# Patient Record
Sex: FEMALE | Race: WHITE | NOT HISPANIC OR LATINO | Employment: UNEMPLOYED | URBAN - NONMETROPOLITAN AREA
[De-identification: names, ages, dates, MRNs, and addresses within clinical notes are randomized per-mention and may not be internally consistent; named-entity substitution may affect disease eponyms.]

---

## 2017-08-03 ENCOUNTER — INITIAL PRENATAL (OUTPATIENT)
Dept: OBSTETRICS AND GYNECOLOGY | Facility: CLINIC | Age: 27
End: 2017-08-03

## 2017-08-03 VITALS
WEIGHT: 117 LBS | SYSTOLIC BLOOD PRESSURE: 120 MMHG | HEIGHT: 59 IN | DIASTOLIC BLOOD PRESSURE: 58 MMHG | BODY MASS INDEX: 23.59 KG/M2

## 2017-08-03 DIAGNOSIS — O99.320 SUBSTANCE ABUSE AFFECTING PREGNANCY, ANTEPARTUM: ICD-10-CM

## 2017-08-03 DIAGNOSIS — Z34.82 ENCOUNTER FOR SUPERVISION OF OTHER NORMAL PREGNANCY IN SECOND TRIMESTER: Primary | ICD-10-CM

## 2017-08-03 PROBLEM — F19.10 SUBSTANCE ABUSE AFFECTING PREGNANCY, ANTEPARTUM: Status: ACTIVE | Noted: 2017-08-03

## 2017-08-03 PROBLEM — Z34.90 SUPERVISION OF NORMAL PREGNANCY: Status: ACTIVE | Noted: 2017-08-03

## 2017-08-03 PROCEDURE — 99213 OFFICE O/P EST LOW 20 MIN: CPT | Performed by: MIDWIFE

## 2017-08-03 RX ORDER — BUPRENORPHINE HYDROCHLORIDE 8 MG/1
TABLET SUBLINGUAL
Refills: 0 | Status: ON HOLD | COMMUNITY
Start: 2017-07-03 | End: 2023-01-05

## 2017-08-03 RX ORDER — PRENATAL VIT NO.126/IRON/FOLIC 28MG-0.8MG
TABLET ORAL DAILY
Status: ON HOLD | COMMUNITY
End: 2023-01-05

## 2017-08-03 NOTE — PROGRESS NOTES
Subjective     Chief Complaint   Patient presents with   • Initial Prenatal Visit     Transfer from Dr Strickland in [facility], has had anatomy scan but gender not determined,       Anna Calix is a 26 y.o. year old .  Patient's last menstrual period was 2017..  She presents to be seen to initiate prenatal care with our practice. She has received care with Dr Strickland in [place]. Her prenatal course has been complicated by Subutex use. She receives this by Rx from Dr Ortiz in [place]. She is taking 8mg daily and trying to cut back. Wants to be off of it by the time baby is born. 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      The following portions of the patient's history were reviewed and updated as appropriate:vital signs, allergies, current medications, past medical history, past social history, past surgical history and problem list.    Review of Systems -   General: NAD  Gastrointestinal: denies nausea, vomiting, diarrhea or constipation   Genitourinary: denies dysuria    Objective     Physical Exam  Constitutional   The patient is awake, alert, well developed, well nourished and well groomed. Very talkative.  Cardiovascular  Heart regular rate and rhythm  No lower extremity edema  Respiratory  The patient is relaxed and breathes without effort. Lungs CTAB  Gastrointestinal   The abdomen is soft and non tender. Gravid. Size approximate to dates  +FHT's  Psychiatric :  Oriented to person, place, and time. Speech is fluent and words are clear. Thought processes are coherent, insight is good.     Imaging   No data reviewed      ASSESSMENT  1. IUP @ 26w1d  2. Transfer of care    PLAN  1. Tests ordered today:  Orders Placed This Encounter   Procedures   • US Ob Follow Up Transabdominal Approach     Standing Status:   Future     Standing Expiration Date:   8/3/2018     Order Specific Question:   Reason for Exam:     Answer:   growth scan     2. Medications prescribed today:  New Medications Ordered This Visit    Medications   • buprenorphine (SUBUTEX) 8 MG sublingual tablet SL tablet     Refill:  0   • Prenatal Vit-Fe Fumarate-FA (PRENATAL, CLASSIC, VITAMIN) 28-0.8 MG tablet tablet     Sig: Take  by mouth Daily.     3. Information reviewed:/Encouraged Questions & answered   4. Request records from previous OB-GYN practice   5. Call prn      Follow up: 4 week(s)         This note was electronically signed.    Sue Sultana CNM  August 3, 2017

## 2017-09-11 ENCOUNTER — ROUTINE PRENATAL (OUTPATIENT)
Dept: OBSTETRICS AND GYNECOLOGY | Facility: CLINIC | Age: 27
End: 2017-09-11

## 2017-09-11 VITALS — WEIGHT: 119 LBS | BODY MASS INDEX: 24.04 KG/M2 | DIASTOLIC BLOOD PRESSURE: 60 MMHG | SYSTOLIC BLOOD PRESSURE: 114 MMHG

## 2017-09-11 DIAGNOSIS — Z34.83 ENCOUNTER FOR SUPERVISION OF OTHER NORMAL PREGNANCY IN THIRD TRIMESTER: ICD-10-CM

## 2017-09-11 DIAGNOSIS — O99.320 SUBSTANCE ABUSE AFFECTING PREGNANCY, ANTEPARTUM: ICD-10-CM

## 2017-09-11 DIAGNOSIS — J40 BRONCHITIS: Primary | ICD-10-CM

## 2017-09-11 PROCEDURE — 99213 OFFICE O/P EST LOW 20 MIN: CPT | Performed by: OBSTETRICS & GYNECOLOGY

## 2017-09-11 RX ORDER — AZITHROMYCIN 250 MG/1
TABLET, FILM COATED ORAL
Qty: 6 TABLET | Refills: 0 | Status: SHIPPED | OUTPATIENT
Start: 2017-09-11 | End: 2017-10-30

## 2017-09-11 NOTE — PROGRESS NOTES
Chief Complaint   Patient presents with   • Routine Prenatal Visit     patient complains of cough and congestion.        HPI: Anna is a  currently at 31w5d who today reports the following:  Contractions - No; Leaking - No; Vaginal bleeding -  No; Heartburn - No.  Patient with complaints of cough and congestion that is yellow in nature.  Pt also reports low grade fever.  Pt also reports starting to smoke recently as patient has been decreasing subutex.  Patient reports having all her labs done at Dr. Strickland's office including glucola.  ROS:   GI:   Nausea - No; Constipation - No; Diarrhea - No.   Neuro:  Headache - No; Visual disturbances - No.    EXAM:   Vitals:  See prenatal flowsheet as noted and reviewed   Lungs:  CTA; no wheezes noted; few ronchi that clears with coughing   CV:  RRR   Abdomen:   See prenatal flowsheet as noted and reviewed   Pelvic:  See prenatal flowsheet as noted and reviewed   Urine:  See prenatal flowsheet as noted and reviewed     No results found for: ABORH, ABO, RH, LABANTI, ABSCRN    MDM:  Impression: Supervision of pregnancy  +Smoker   +History of drug abuse  +Bronchitis   Tests done today: Rx Zithromax given  Instructions and precautions given  Previous scan reviewed with patient   Topics discussed: kick counts and fetal movement   labor signs and symptoms  tobacco use   Need copy of previous labs from Dr. Strickland   Tests next visit: Scan next visit to assess growth given history and findings     This note was electronically signed.  Barbie Hyman M.D.

## 2017-10-02 ENCOUNTER — ROUTINE PRENATAL (OUTPATIENT)
Dept: OBSTETRICS AND GYNECOLOGY | Facility: CLINIC | Age: 27
End: 2017-10-02

## 2017-10-02 VITALS — DIASTOLIC BLOOD PRESSURE: 62 MMHG | SYSTOLIC BLOOD PRESSURE: 108 MMHG | BODY MASS INDEX: 24.64 KG/M2 | WEIGHT: 122 LBS

## 2017-10-02 DIAGNOSIS — O99.320 SUBSTANCE ABUSE AFFECTING PREGNANCY, ANTEPARTUM: ICD-10-CM

## 2017-10-02 DIAGNOSIS — Z34.83 ENCOUNTER FOR SUPERVISION OF OTHER NORMAL PREGNANCY IN THIRD TRIMESTER: ICD-10-CM

## 2017-10-02 PROCEDURE — 99212 OFFICE O/P EST SF 10 MIN: CPT | Performed by: OBSTETRICS & GYNECOLOGY

## 2017-10-02 NOTE — PATIENT INSTRUCTIONS
Prenatal Care  WHAT IS PRENATAL CARE?   Prenatal care is the process of caring for a pregnant woman before she gives birth. Prenatal care makes sure that she and her baby remain as healthy as possible throughout pregnancy. Prenatal care may be provided by a midwife, family practice health care provider, or a childbirth and pregnancy specialist (obstetrician). Prenatal care may include physical examinations, testing, treatments, and education on nutrition, lifestyle, and social support services.  WHY IS PRENATAL CARE SO IMPORTANT?   Early and consistent prenatal care increases the chance that you and your baby will remain healthy throughout your pregnancy. This type of care also decreases a baby's risk of being born too early (prematurely), or being born smaller than expected (small for gestational age). Any underlying medical conditions you may have that could pose a risk during your pregnancy are discussed during prenatal care visits. You will also be monitored regularly for any new conditions that may arise during your pregnancy so they can be treated quickly and effectively.  WHAT HAPPENS DURING PRENATAL CARE VISITS?  Prenatal care visits may include the following:  Discussion  Tell your health care provider about any new signs or symptoms you have experienced since your last visit. These might include:  · Nausea or vomiting.  · Increased or decreased level of energy.  · Difficulty sleeping.  · Back or leg pain.  · Weight changes.  · Frequent urination.  · Shortness of breath with physical activity.  · Changes in your skin, such as the development of a rash or itchiness.  · Vaginal discharge or bleeding.  · Feelings of excitement or nervousness.  · Changes in your baby's movements.  You may want to write down any questions or topics you want to discuss with your health care provider and bring them with you to your appointment.  Examination  During your first prenatal care visit, you will likely have a complete  physical exam. Your health care provider will often examine your vagina, cervix, and the position of your uterus, as well as check your heart, lungs, and other body systems. As your pregnancy progresses, your health care provider will measure the size of your uterus and your baby's position inside your uterus. He or she may also examine you for early signs of labor. Your prenatal visits may also include checking your blood pressure and, after about 10-12 weeks of pregnancy, listening to your baby's heartbeat.  Testing  Regular testing often includes:  · Urinalysis. This checks your urine for glucose, protein, or signs of infection.  · Blood count. This checks the levels of white and red blood cells in your body.  · Tests for sexually transmitted infections (STIs). Testing for STIs at the beginning of pregnancy is routinely done and is required in many states.  · Antibody testing. You will be checked to see if you are immune to certain illnesses, such as rubella, that can affect a developing fetus.  · Glucose screen. Around 24-28 weeks of pregnancy, your blood glucose level will be checked for signs of gestational diabetes. Follow-up tests may be recommended.  · Group B strep. This is a bacteria that is commonly found inside a woman's vagina. This test will inform your health care provider if you need an antibiotic to reduce the amount of this bacteria in your body prior to labor and childbirth.  · Ultrasound. Many pregnant women undergo an ultrasound screening around 18-20 weeks of pregnancy to evaluate the health of the fetus and check for any developmental abnormalities.  · HIV (human immunodeficiency virus) testing. Early in your pregnancy, you will be screened for HIV. If you are at high risk for HIV, this test may be repeated during your third trimester of pregnancy.  You may be offered other testing based on your age, personal or family medical history, or other factors.   HOW OFTEN SHOULD I PLAN TO SEE MY  HEALTH CARE PROVIDER FOR PRENATAL CARE?  Your prenatal care check-up schedule depends on any medical conditions you have before, or develop during, your pregnancy. If you do not have any underlying medical conditions, you will likely be seen for checkups:  · Monthly, during the first 6 months of pregnancy.  · Twice a month during months 7 and 8 of pregnancy.  · Weekly starting in the 9th month of pregnancy and until delivery.  If you develop signs of early labor or other concerning signs or symptoms, you may need to see your health care provider more often. Ask your health care provider what prenatal care schedule is best for you.  WHAT CAN I DO TO KEEP MYSELF AND MY BABY AS HEALTHY AS POSSIBLE DURING MY PREGNANCY?  · Take a prenatal vitamin containing 400 micrograms (0.4 mg) of folic acid every day. Your health care provider may also ask you to take additional vitamins such as iodine, vitamin D, iron, copper, and zinc.  · Take 2329-2611 mg of calcium daily starting at your 20th week of pregnancy until you deliver your baby.  · Make sure you are up to date on your vaccinations. Unless directed otherwise by your health care provider:    You should receive a tetanus, diphtheria, and pertussis (Tdap) vaccination between the 27th and 36th week of your pregnancy, regardless of when your last Tdap immunization occurred. This helps protect your baby from whooping cough (pertussis) after he or she is born.    You should receive an annual inactivated influenza vaccine (IIV) to help protect you and your baby from influenza. This can be done at any point during your pregnancy.  · Eat a well-rounded diet that includes:    Fresh fruits and vegetables.    Lean proteins.    Calcium-rich foods such as milk, yogurt, hard cheeses, and dark, leafy greens.    Whole grain breads.  · Do not eat seafood high in mercury, including:    Swordfish.    Tilefish.    Shark.    Lenny mackerel.    More than 6 oz tuna per week.  · Do not eat:    Raw  or undercooked meats or eggs.    Unpasteurized foods, such as soft cheeses (brie, blue, or feta), juices, and milks.    Lunch meats.    Hot dogs that have not been heated until they are steaming.  · Drink enough water to keep your urine clear or pale yellow. For many women, this may be 10 or more 8 oz glasses of water each day. Keeping yourself hydrated helps deliver nutrients to your baby and may prevent the start of pre-term uterine contractions.  · Do not use any tobacco products including cigarettes, chewing tobacco, or electronic cigarettes. If you need help quitting, ask your health care provider.  · Do not drink beverages containing alcohol. No safe level of alcohol consumption during pregnancy has been determined.  · Do not use any illegal drugs. These can harm your developing baby or cause a miscarriage.  · Ask your health care provider or pharmacist before taking any prescription or over-the-counter medicines, herbs, or supplements.  · Limit your caffeine intake to no more than 200 mg per day.  · Exercise. Unless told otherwise by your health care provider, try to get 30 minutes of moderate exercise most days of the week. Do not  do high-impact activities, contact sports, or activities with a high risk of falling, such as horseback riding or downhill skiing.  · Get plenty of rest.  · Avoid anything that raises your body temperature, such as hot tubs and saunas.  · If you own a cat, do not empty its litter box. Bacteria contained in cat feces can cause an infection called toxoplasmosis. This can result in serious harm to the fetus.  · Stay away from chemicals such as insecticides, lead, mercury, and cleaning or paint products that contain solvents.  · Do not have any X-rays taken unless medically necessary.  · Take a childbirth and breastfeeding preparation class. Ask your health care provider if you need a referral or recommendation.     This information is not intended to replace advice given to you by  your health care provider. Make sure you discuss any questions you have with your health care provider.     Document Released: 12/20/2004 Document Revised: 04/10/2017 Document Reviewed: 03/04/2015  Elsevier Interactive Patient Education ©2017 [employer]

## 2017-10-02 NOTE — PROGRESS NOTES
Chief Complaint   Patient presents with   • Routine Prenatal Visit     No complaints        HPI:   , 34w5d gestation reports doing well, U/S 79%, LISSA 16. Vertex, Prior  x 2, Proven to 7#8    ROS:  See Prenatal Episode/Flowsheet  /62  Wt 122 lb (55.3 kg)  LMP 2017  BMI 24.64 kg/m2     EXAM:  EXTREMITIES:  No swelling-See Prenatal Episode/Flowsheet    ABDOMEN:  FHTs/Movement noted-See Prenatal Episode/Flowsheet    URINE GLUCOSE/PROTEIN:  See Prenatal Episode/Flowsheet    PELVIC EXAM:  See Prenatal Episode/Flowsheet    MDM:    No results found for: HGB, RUBELLAIGGIN, RUBELLASCRN, HEPBSAG, LABRPR, ABORH, ABO, RH, ABSCRN, LABANTI, ABID, [id number], [id number], HEPCVIRUSABY, [id number], GBSANTIGEN, URINECX    Routine care. AB+/RI/RS/HCV+  Dicussed subutex in pregnancy    Glucola tmorrow as we have not gotten results from prior MD

## 2017-10-03 ENCOUNTER — RESULTS ENCOUNTER (OUTPATIENT)
Dept: OBSTETRICS AND GYNECOLOGY | Facility: CLINIC | Age: 27
End: 2017-10-03

## 2017-10-03 DIAGNOSIS — Z34.83 ENCOUNTER FOR SUPERVISION OF OTHER NORMAL PREGNANCY IN THIRD TRIMESTER: ICD-10-CM

## 2017-10-03 LAB
BASOPHILS # BLD AUTO: 0.04 10*3/MM3 (ref 0–0.2)
BASOPHILS NFR BLD AUTO: 0.5 % (ref 0–2.5)
EOSINOPHIL # BLD AUTO: 0.07 10*3/MM3 (ref 0–0.7)
EOSINOPHIL NFR BLD AUTO: 0.9 % (ref 0–7)
ERYTHROCYTE [DISTWIDTH] IN BLOOD BY AUTOMATED COUNT: 13.8 % (ref 11.5–14.5)
GLUCOSE 1H P 50 G GLC PO SERPL-MCNC: 126 MG/DL
HCT VFR BLD AUTO: 38.6 % (ref 37–47)
HGB BLD-MCNC: 12.8 G/DL (ref 12–16)
IMM GRANULOCYTES # BLD: 0.09 10*3/MM3 (ref 0–0.06)
IMM GRANULOCYTES NFR BLD: 1.2 % (ref 0–0.6)
LYMPHOCYTES # BLD AUTO: 1.71 10*3/MM3 (ref 0.6–3.4)
LYMPHOCYTES NFR BLD AUTO: 22.9 % (ref 10–50)
MCH RBC QN AUTO: 31.1 PG (ref 27–31)
MCHC RBC AUTO-ENTMCNC: 33.2 G/DL (ref 30–37)
MCV RBC AUTO: 93.7 FL (ref 81–99)
MONOCYTES # BLD AUTO: 0.36 10*3/MM3 (ref 0–0.9)
MONOCYTES NFR BLD AUTO: 4.8 % (ref 0–12)
NEUTROPHILS # BLD AUTO: 5.2 10*3/MM3 (ref 2–6.9)
NEUTROPHILS NFR BLD AUTO: 69.7 % (ref 37–80)
NRBC BLD AUTO-RTO: 0 /100 WBC (ref 0–0)
PLATELET # BLD AUTO: 155 10*3/MM3 (ref 130–400)
RBC # BLD AUTO: 4.12 10*6/MM3 (ref 4.2–5.4)
WBC # BLD AUTO: 7.47 10*3/MM3 (ref 4.8–10.8)

## 2017-10-16 ENCOUNTER — ROUTINE PRENATAL (OUTPATIENT)
Dept: OBSTETRICS AND GYNECOLOGY | Facility: CLINIC | Age: 27
End: 2017-10-16

## 2017-10-16 VITALS — BODY MASS INDEX: 24.84 KG/M2 | WEIGHT: 123 LBS | DIASTOLIC BLOOD PRESSURE: 70 MMHG | SYSTOLIC BLOOD PRESSURE: 124 MMHG

## 2017-10-16 DIAGNOSIS — Z34.83 ENCOUNTER FOR SUPERVISION OF OTHER NORMAL PREGNANCY IN THIRD TRIMESTER: ICD-10-CM

## 2017-10-16 DIAGNOSIS — O99.320 SUBSTANCE ABUSE AFFECTING PREGNANCY, ANTEPARTUM: ICD-10-CM

## 2017-10-16 PROCEDURE — 99212 OFFICE O/P EST SF 10 MIN: CPT | Performed by: OBSTETRICS & GYNECOLOGY

## 2017-10-16 NOTE — PROGRESS NOTES
Chief Complaint   Patient presents with   • Routine Prenatal Visit     No complaints, Good Fetal Movement        HPI:   , 36w5d gestation reports doing well, normal gluocla    ROS:  See Prenatal Episode/Flowsheet  /70  Wt 123 lb (55.8 kg)  LMP 2017  BMI 24.84 kg/m2     EXAM:  EXTREMITIES:  No swelling-See Prenatal Episode/Flowsheet    ABDOMEN:  FHTs/Movement noted-See Prenatal Episode/Flowsheet    URINE GLUCOSE/PROTEIN:  See Prenatal Episode/Flowsheet    PELVIC EXAM:  See Prenatal Episode/Flowsheet    MDM:    Lab Results   Component Value Date    HGB 12.8 10/03/2017       Routine care G3, GBS today

## 2017-10-16 NOTE — PATIENT INSTRUCTIONS
Prenatal Care  WHAT IS PRENATAL CARE?   Prenatal care is the process of caring for a pregnant woman before she gives birth. Prenatal care makes sure that she and her baby remain as healthy as possible throughout pregnancy. Prenatal care may be provided by a midwife, family practice health care provider, or a childbirth and pregnancy specialist (obstetrician). Prenatal care may include physical examinations, testing, treatments, and education on nutrition, lifestyle, and social support services.  WHY IS PRENATAL CARE SO IMPORTANT?   Early and consistent prenatal care increases the chance that you and your baby will remain healthy throughout your pregnancy. This type of care also decreases a baby's risk of being born too early (prematurely), or being born smaller than expected (small for gestational age). Any underlying medical conditions you may have that could pose a risk during your pregnancy are discussed during prenatal care visits. You will also be monitored regularly for any new conditions that may arise during your pregnancy so they can be treated quickly and effectively.  WHAT HAPPENS DURING PRENATAL CARE VISITS?  Prenatal care visits may include the following:  Discussion  Tell your health care provider about any new signs or symptoms you have experienced since your last visit. These might include:  · Nausea or vomiting.  · Increased or decreased level of energy.  · Difficulty sleeping.  · Back or leg pain.  · Weight changes.  · Frequent urination.  · Shortness of breath with physical activity.  · Changes in your skin, such as the development of a rash or itchiness.  · Vaginal discharge or bleeding.  · Feelings of excitement or nervousness.  · Changes in your baby's movements.  You may want to write down any questions or topics you want to discuss with your health care provider and bring them with you to your appointment.  Examination  During your first prenatal care visit, you will likely have a complete  physical exam. Your health care provider will often examine your vagina, cervix, and the position of your uterus, as well as check your heart, lungs, and other body systems. As your pregnancy progresses, your health care provider will measure the size of your uterus and your baby's position inside your uterus. He or she may also examine you for early signs of labor. Your prenatal visits may also include checking your blood pressure and, after about 10-12 weeks of pregnancy, listening to your baby's heartbeat.  Testing  Regular testing often includes:  · Urinalysis. This checks your urine for glucose, protein, or signs of infection.  · Blood count. This checks the levels of white and red blood cells in your body.  · Tests for sexually transmitted infections (STIs). Testing for STIs at the beginning of pregnancy is routinely done and is required in many states.  · Antibody testing. You will be checked to see if you are immune to certain illnesses, such as rubella, that can affect a developing fetus.  · Glucose screen. Around 24-28 weeks of pregnancy, your blood glucose level will be checked for signs of gestational diabetes. Follow-up tests may be recommended.  · Group B strep. This is a bacteria that is commonly found inside a woman's vagina. This test will inform your health care provider if you need an antibiotic to reduce the amount of this bacteria in your body prior to labor and childbirth.  · Ultrasound. Many pregnant women undergo an ultrasound screening around 18-20 weeks of pregnancy to evaluate the health of the fetus and check for any developmental abnormalities.  · HIV (human immunodeficiency virus) testing. Early in your pregnancy, you will be screened for HIV. If you are at high risk for HIV, this test may be repeated during your third trimester of pregnancy.  You may be offered other testing based on your age, personal or family medical history, or other factors.   HOW OFTEN SHOULD I PLAN TO SEE MY  HEALTH CARE PROVIDER FOR PRENATAL CARE?  Your prenatal care check-up schedule depends on any medical conditions you have before, or develop during, your pregnancy. If you do not have any underlying medical conditions, you will likely be seen for checkups:  · Monthly, during the first 6 months of pregnancy.  · Twice a month during months 7 and 8 of pregnancy.  · Weekly starting in the 9th month of pregnancy and until delivery.  If you develop signs of early labor or other concerning signs or symptoms, you may need to see your health care provider more often. Ask your health care provider what prenatal care schedule is best for you.  WHAT CAN I DO TO KEEP MYSELF AND MY BABY AS HEALTHY AS POSSIBLE DURING MY PREGNANCY?  · Take a prenatal vitamin containing 400 micrograms (0.4 mg) of folic acid every day. Your health care provider may also ask you to take additional vitamins such as iodine, vitamin D, iron, copper, and zinc.  · Take 4580-0794 mg of calcium daily starting at your 20th week of pregnancy until you deliver your baby.  · Make sure you are up to date on your vaccinations. Unless directed otherwise by your health care provider:    You should receive a tetanus, diphtheria, and pertussis (Tdap) vaccination between the 27th and 36th week of your pregnancy, regardless of when your last Tdap immunization occurred. This helps protect your baby from whooping cough (pertussis) after he or she is born.    You should receive an annual inactivated influenza vaccine (IIV) to help protect you and your baby from influenza. This can be done at any point during your pregnancy.  · Eat a well-rounded diet that includes:    Fresh fruits and vegetables.    Lean proteins.    Calcium-rich foods such as milk, yogurt, hard cheeses, and dark, leafy greens.    Whole grain breads.  · Do not eat seafood high in mercury, including:    Swordfish.    Tilefish.    Shark.    Lenny mackerel.    More than 6 oz tuna per week.  · Do not eat:    Raw  or undercooked meats or eggs.    Unpasteurized foods, such as soft cheeses (brie, blue, or feta), juices, and milks.    Lunch meats.    Hot dogs that have not been heated until they are steaming.  · Drink enough water to keep your urine clear or pale yellow. For many women, this may be 10 or more 8 oz glasses of water each day. Keeping yourself hydrated helps deliver nutrients to your baby and may prevent the start of pre-term uterine contractions.  · Do not use any tobacco products including cigarettes, chewing tobacco, or electronic cigarettes. If you need help quitting, ask your health care provider.  · Do not drink beverages containing alcohol. No safe level of alcohol consumption during pregnancy has been determined.  · Do not use any illegal drugs. These can harm your developing baby or cause a miscarriage.  · Ask your health care provider or pharmacist before taking any prescription or over-the-counter medicines, herbs, or supplements.  · Limit your caffeine intake to no more than 200 mg per day.  · Exercise. Unless told otherwise by your health care provider, try to get 30 minutes of moderate exercise most days of the week. Do not  do high-impact activities, contact sports, or activities with a high risk of falling, such as horseback riding or downhill skiing.  · Get plenty of rest.  · Avoid anything that raises your body temperature, such as hot tubs and saunas.  · If you own a cat, do not empty its litter box. Bacteria contained in cat feces can cause an infection called toxoplasmosis. This can result in serious harm to the fetus.  · Stay away from chemicals such as insecticides, lead, mercury, and cleaning or paint products that contain solvents.  · Do not have any X-rays taken unless medically necessary.  · Take a childbirth and breastfeeding preparation class. Ask your health care provider if you need a referral or recommendation.     This information is not intended to replace advice given to you by  your health care provider. Make sure you discuss any questions you have with your health care provider.     Document Released: 12/20/2004 Document Revised: 04/10/2017 Document Reviewed: 03/04/2015  Elsevier Interactive Patient Education ©2017 [employer]

## 2017-10-18 LAB — GP B STREP DNA SPEC QL NAA+PROBE: NEGATIVE

## 2017-10-23 ENCOUNTER — ROUTINE PRENATAL (OUTPATIENT)
Dept: OBSTETRICS AND GYNECOLOGY | Facility: CLINIC | Age: 27
End: 2017-10-23

## 2017-10-23 VITALS — DIASTOLIC BLOOD PRESSURE: 70 MMHG | WEIGHT: 125 LBS | BODY MASS INDEX: 25.25 KG/M2 | SYSTOLIC BLOOD PRESSURE: 110 MMHG

## 2017-10-23 DIAGNOSIS — Z34.83 ENCOUNTER FOR SUPERVISION OF OTHER NORMAL PREGNANCY IN THIRD TRIMESTER: ICD-10-CM

## 2017-10-23 DIAGNOSIS — O99.320 SUBSTANCE ABUSE AFFECTING PREGNANCY, ANTEPARTUM: ICD-10-CM

## 2017-10-23 PROCEDURE — 99212 OFFICE O/P EST SF 10 MIN: CPT | Performed by: OBSTETRICS & GYNECOLOGY

## 2017-10-23 NOTE — PROGRESS NOTES
Chief Complaint   Patient presents with   • Routine Prenatal Visit     No Complaints        HPI:   , 37w5d gestation reports doing well    ROS:  See Prenatal Episode/Flowsheet  /70  Wt 125 lb (56.7 kg)  LMP 2017  BMI 25.25 kg/m2     EXAM:  EXTREMITIES:  No swelling-See Prenatal Episode/Flowsheet    ABDOMEN:  FHTs/Movement noted-See Prenatal Episode/Flowsheet    URINE GLUCOSE/PROTEIN:  See Prenatal Episode/Flowsheet    PELVIC EXAM:  See Prenatal Episode/Flowsheet    MDM:    Lab Results   Component Value Date    HGB 12.8 10/03/2017       Routine care G3, doing well

## 2017-10-23 NOTE — PATIENT INSTRUCTIONS
Prenatal Care  WHAT IS PRENATAL CARE?   Prenatal care is the process of caring for a pregnant woman before she gives birth. Prenatal care makes sure that she and her baby remain as healthy as possible throughout pregnancy. Prenatal care may be provided by a midwife, family practice health care provider, or a childbirth and pregnancy specialist (obstetrician). Prenatal care may include physical examinations, testing, treatments, and education on nutrition, lifestyle, and social support services.  WHY IS PRENATAL CARE SO IMPORTANT?   Early and consistent prenatal care increases the chance that you and your baby will remain healthy throughout your pregnancy. This type of care also decreases a baby's risk of being born too early (prematurely), or being born smaller than expected (small for gestational age). Any underlying medical conditions you may have that could pose a risk during your pregnancy are discussed during prenatal care visits. You will also be monitored regularly for any new conditions that may arise during your pregnancy so they can be treated quickly and effectively.  WHAT HAPPENS DURING PRENATAL CARE VISITS?  Prenatal care visits may include the following:  Discussion  Tell your health care provider about any new signs or symptoms you have experienced since your last visit. These might include:  · Nausea or vomiting.  · Increased or decreased level of energy.  · Difficulty sleeping.  · Back or leg pain.  · Weight changes.  · Frequent urination.  · Shortness of breath with physical activity.  · Changes in your skin, such as the development of a rash or itchiness.  · Vaginal discharge or bleeding.  · Feelings of excitement or nervousness.  · Changes in your baby's movements.  You may want to write down any questions or topics you want to discuss with your health care provider and bring them with you to your appointment.  Examination  During your first prenatal care visit, you will likely have a complete  physical exam. Your health care provider will often examine your vagina, cervix, and the position of your uterus, as well as check your heart, lungs, and other body systems. As your pregnancy progresses, your health care provider will measure the size of your uterus and your baby's position inside your uterus. He or she may also examine you for early signs of labor. Your prenatal visits may also include checking your blood pressure and, after about 10-12 weeks of pregnancy, listening to your baby's heartbeat.  Testing  Regular testing often includes:  · Urinalysis. This checks your urine for glucose, protein, or signs of infection.  · Blood count. This checks the levels of white and red blood cells in your body.  · Tests for sexually transmitted infections (STIs). Testing for STIs at the beginning of pregnancy is routinely done and is required in many states.  · Antibody testing. You will be checked to see if you are immune to certain illnesses, such as rubella, that can affect a developing fetus.  · Glucose screen. Around 24-28 weeks of pregnancy, your blood glucose level will be checked for signs of gestational diabetes. Follow-up tests may be recommended.  · Group B strep. This is a bacteria that is commonly found inside a woman's vagina. This test will inform your health care provider if you need an antibiotic to reduce the amount of this bacteria in your body prior to labor and childbirth.  · Ultrasound. Many pregnant women undergo an ultrasound screening around 18-20 weeks of pregnancy to evaluate the health of the fetus and check for any developmental abnormalities.  · HIV (human immunodeficiency virus) testing. Early in your pregnancy, you will be screened for HIV. If you are at high risk for HIV, this test may be repeated during your third trimester of pregnancy.  You may be offered other testing based on your age, personal or family medical history, or other factors.   HOW OFTEN SHOULD I PLAN TO SEE MY  HEALTH CARE PROVIDER FOR PRENATAL CARE?  Your prenatal care check-up schedule depends on any medical conditions you have before, or develop during, your pregnancy. If you do not have any underlying medical conditions, you will likely be seen for checkups:  · Monthly, during the first 6 months of pregnancy.  · Twice a month during months 7 and 8 of pregnancy.  · Weekly starting in the 9th month of pregnancy and until delivery.  If you develop signs of early labor or other concerning signs or symptoms, you may need to see your health care provider more often. Ask your health care provider what prenatal care schedule is best for you.  WHAT CAN I DO TO KEEP MYSELF AND MY BABY AS HEALTHY AS POSSIBLE DURING MY PREGNANCY?  · Take a prenatal vitamin containing 400 micrograms (0.4 mg) of folic acid every day. Your health care provider may also ask you to take additional vitamins such as iodine, vitamin D, iron, copper, and zinc.  · Take 1686-3754 mg of calcium daily starting at your 20th week of pregnancy until you deliver your baby.  · Make sure you are up to date on your vaccinations. Unless directed otherwise by your health care provider:    You should receive a tetanus, diphtheria, and pertussis (Tdap) vaccination between the 27th and 36th week of your pregnancy, regardless of when your last Tdap immunization occurred. This helps protect your baby from whooping cough (pertussis) after he or she is born.    You should receive an annual inactivated influenza vaccine (IIV) to help protect you and your baby from influenza. This can be done at any point during your pregnancy.  · Eat a well-rounded diet that includes:    Fresh fruits and vegetables.    Lean proteins.    Calcium-rich foods such as milk, yogurt, hard cheeses, and dark, leafy greens.    Whole grain breads.  · Do not eat seafood high in mercury, including:    Swordfish.    Tilefish.    Shark.    Lenny mackerel.    More than 6 oz tuna per week.  · Do not eat:    Raw  or undercooked meats or eggs.    Unpasteurized foods, such as soft cheeses (brie, blue, or feta), juices, and milks.    Lunch meats.    Hot dogs that have not been heated until they are steaming.  · Drink enough water to keep your urine clear or pale yellow. For many women, this may be 10 or more 8 oz glasses of water each day. Keeping yourself hydrated helps deliver nutrients to your baby and may prevent the start of pre-term uterine contractions.  · Do not use any tobacco products including cigarettes, chewing tobacco, or electronic cigarettes. If you need help quitting, ask your health care provider.  · Do not drink beverages containing alcohol. No safe level of alcohol consumption during pregnancy has been determined.  · Do not use any illegal drugs. These can harm your developing baby or cause a miscarriage.  · Ask your health care provider or pharmacist before taking any prescription or over-the-counter medicines, herbs, or supplements.  · Limit your caffeine intake to no more than 200 mg per day.  · Exercise. Unless told otherwise by your health care provider, try to get 30 minutes of moderate exercise most days of the week. Do not  do high-impact activities, contact sports, or activities with a high risk of falling, such as horseback riding or downhill skiing.  · Get plenty of rest.  · Avoid anything that raises your body temperature, such as hot tubs and saunas.  · If you own a cat, do not empty its litter box. Bacteria contained in cat feces can cause an infection called toxoplasmosis. This can result in serious harm to the fetus.  · Stay away from chemicals such as insecticides, lead, mercury, and cleaning or paint products that contain solvents.  · Do not have any X-rays taken unless medically necessary.  · Take a childbirth and breastfeeding preparation class. Ask your health care provider if you need a referral or recommendation.     This information is not intended to replace advice given to you by  your health care provider. Make sure you discuss any questions you have with your health care provider.     Document Released: 12/20/2004 Document Revised: 04/10/2017 Document Reviewed: 03/04/2015  Elsevier Interactive Patient Education ©2017 [employer]

## 2017-10-30 ENCOUNTER — ROUTINE PRENATAL (OUTPATIENT)
Dept: OBSTETRICS AND GYNECOLOGY | Facility: CLINIC | Age: 27
End: 2017-10-30

## 2017-10-30 ENCOUNTER — HOSPITAL ENCOUNTER (OUTPATIENT)
Facility: HOSPITAL | Age: 27
Discharge: HOME | End: 2017-10-31
Attending: MIDWIFE | Admitting: MIDWIFE

## 2017-10-30 VITALS — BODY MASS INDEX: 25.45 KG/M2 | WEIGHT: 126 LBS | DIASTOLIC BLOOD PRESSURE: 64 MMHG | SYSTOLIC BLOOD PRESSURE: 120 MMHG

## 2017-10-30 DIAGNOSIS — O99.320 SUBSTANCE ABUSE AFFECTING PREGNANCY, ANTEPARTUM: ICD-10-CM

## 2017-10-30 DIAGNOSIS — Z34.83 ENCOUNTER FOR SUPERVISION OF OTHER NORMAL PREGNANCY IN THIRD TRIMESTER: ICD-10-CM

## 2017-10-30 DIAGNOSIS — Z34.93 NORMAL PREGNANCY, THIRD TRIMESTER: Primary | ICD-10-CM

## 2017-10-30 PROCEDURE — 99213 OFFICE O/P EST LOW 20 MIN: CPT | Performed by: NURSE PRACTITIONER

## 2017-10-30 NOTE — PROGRESS NOTES
Chief Complaint   Patient presents with   • Routine Prenatal Visit     cramping, pelvic pressure, back pain         HPI  , 38w5d reports would like induction - lives far away - concerned will deliver fast  C/o low back pain and pelvic pressure with cramps - denies fluid leaking or vaginal bleeding  Good FM    ROS  /64  Wt 126 lb (57.2 kg)  LMP 2017  BMI 25.45 kg/m2 -See Prenatal Assessment    ROS:  GI: Nausea - No; Constipation - No; Diarrhea - No    Neuro: Headache - No; Visual change - No      EXAM  General Appearance:  Lungs: Breathing unlabored  Abdomen:  See flow sheet for Fundal ht, FM, FHT's  LE: Neg edema    MDM  Impression:  Problems/Risks: Normal Pregnancy  Desires induction   Tests done today: none   Topics discussed: S/S labor and adeq FM/Kick Counts  option to strip membranes - declined - wants to wait after Halloween and then induce at 39 wks  - option for induction   Wed at 0500 or any other day after Wed.  - discussed risk of C/S with induction - written info given - and may opt out if chooses.   Request induction on Thurs   Tests next visit: none     OB History      Para Term  AB Living    3 2 2   2    SAB TAB Ectopic Multiple Live Births        2          History reviewed. No pertinent past medical history.    Past Surgical History:   Procedure Laterality Date   • VAGINAL DELIVERY         History reviewed. No pertinent family history.    Social History     Social History   • Marital status: Single     Spouse name: N/A   • Number of children: N/A   • Years of education: N/A     Occupational History   • Not on file.     Social History Main Topics   • Smoking status: Former Smoker   • Smokeless tobacco: Never Used   • Alcohol use No   • Drug use: Yes      Comment: bertha, on subutex now    • Sexual activity: Yes     Partners: Male     Birth control/ protection: None     Other Topics Concern   • Not on file     Social History Narrative

## 2017-10-31 PROCEDURE — 59025 FETAL NON-STRESS TEST: CPT

## 2017-10-31 PROCEDURE — 59025 FETAL NON-STRESS TEST: CPT | Performed by: MIDWIFE

## 2017-10-31 PROCEDURE — G0463 HOSPITAL OUTPT CLINIC VISIT: HCPCS

## 2017-11-01 ENCOUNTER — PREP FOR SURGERY (OUTPATIENT)
Dept: OTHER | Facility: HOSPITAL | Age: 27
End: 2017-11-01

## 2017-11-01 DIAGNOSIS — Z3A.39 39 WEEKS GESTATION OF PREGNANCY: Primary | ICD-10-CM

## 2017-11-01 RX ORDER — HYDROCODONE BITARTRATE AND ACETAMINOPHEN 5; 325 MG/1; MG/1
1 TABLET ORAL EVERY 4 HOURS PRN
Status: CANCELLED | OUTPATIENT
Start: 2017-11-01 | End: 2017-11-11

## 2017-11-01 RX ORDER — SODIUM CHLORIDE, SODIUM LACTATE, POTASSIUM CHLORIDE, CALCIUM CHLORIDE 600; 310; 30; 20 MG/100ML; MG/100ML; MG/100ML; MG/100ML
125 INJECTION, SOLUTION INTRAVENOUS CONTINUOUS
Status: CANCELLED | OUTPATIENT
Start: 2017-11-01

## 2017-11-01 RX ORDER — LIDOCAINE HYDROCHLORIDE 10 MG/ML
5 INJECTION, SOLUTION INFILTRATION; PERINEURAL AS NEEDED
Status: CANCELLED | OUTPATIENT
Start: 2017-11-01

## 2017-11-01 RX ORDER — PROMETHAZINE HYDROCHLORIDE 12.5 MG/1
12.5 TABLET ORAL EVERY 6 HOURS PRN
Status: CANCELLED | OUTPATIENT
Start: 2017-11-01

## 2017-11-01 RX ORDER — MISOPROSTOL 200 UG/1
800 TABLET ORAL AS NEEDED
Status: CANCELLED | OUTPATIENT
Start: 2017-11-01

## 2017-11-01 RX ORDER — PROMETHAZINE HYDROCHLORIDE 12.5 MG/1
12.5 SUPPOSITORY RECTAL EVERY 6 HOURS PRN
Status: CANCELLED | OUTPATIENT
Start: 2017-11-01

## 2017-11-01 RX ORDER — ZOLPIDEM TARTRATE 5 MG/1
10 TABLET ORAL NIGHTLY PRN
Status: CANCELLED | OUTPATIENT
Start: 2017-11-01 | End: 2017-11-11

## 2017-11-01 RX ORDER — PROMETHAZINE HYDROCHLORIDE 25 MG/ML
12.5 INJECTION, SOLUTION INTRAMUSCULAR; INTRAVENOUS EVERY 6 HOURS PRN
Status: CANCELLED | OUTPATIENT
Start: 2017-11-01

## 2017-11-01 RX ORDER — ACETAMINOPHEN 325 MG/1
650 TABLET ORAL EVERY 4 HOURS PRN
Status: CANCELLED | OUTPATIENT
Start: 2017-11-01

## 2017-11-01 RX ORDER — ONDANSETRON 2 MG/ML
4 INJECTION INTRAMUSCULAR; INTRAVENOUS EVERY 6 HOURS PRN
Status: CANCELLED | OUTPATIENT
Start: 2017-11-01

## 2017-11-01 RX ORDER — ONDANSETRON 4 MG/1
4 TABLET, FILM COATED ORAL EVERY 6 HOURS PRN
Status: CANCELLED | OUTPATIENT
Start: 2017-11-01

## 2017-11-01 RX ORDER — MORPHINE SULFATE 10 MG/ML
6 INJECTION, SOLUTION INTRAMUSCULAR; INTRAVENOUS EVERY 4 HOURS PRN
Status: CANCELLED | OUTPATIENT
Start: 2017-11-01 | End: 2017-11-11

## 2017-11-01 RX ORDER — METHYLERGONOVINE MALEATE 0.2 MG/ML
200 INJECTION INTRAVENOUS ONCE AS NEEDED
Status: CANCELLED | OUTPATIENT
Start: 2017-11-01

## 2017-11-01 RX ORDER — CARBOPROST TROMETHAMINE 250 UG/ML
250 INJECTION, SOLUTION INTRAMUSCULAR AS NEEDED
Status: CANCELLED | OUTPATIENT
Start: 2017-11-01

## 2017-11-01 RX ORDER — TERBUTALINE SULFATE 1 MG/ML
0.25 INJECTION, SOLUTION SUBCUTANEOUS AS NEEDED
Status: CANCELLED | OUTPATIENT
Start: 2017-11-01

## 2017-11-01 RX ORDER — ONDANSETRON 4 MG/1
4 TABLET, ORALLY DISINTEGRATING ORAL EVERY 6 HOURS PRN
Status: CANCELLED | OUTPATIENT
Start: 2017-11-01

## 2017-11-01 RX ORDER — CALCIUM CARBONATE 200(500)MG
2 TABLET,CHEWABLE ORAL 3 TIMES DAILY PRN
Status: CANCELLED | OUTPATIENT
Start: 2017-11-01

## 2017-11-01 RX ORDER — SODIUM CHLORIDE 0.9 % (FLUSH) 0.9 %
1-10 SYRINGE (ML) INJECTION AS NEEDED
Status: CANCELLED | OUTPATIENT
Start: 2017-11-01

## 2017-11-02 ENCOUNTER — ANESTHESIA EVENT (OUTPATIENT)
Dept: LABOR AND DELIVERY | Facility: HOSPITAL | Age: 27
End: 2017-11-02

## 2017-11-02 ENCOUNTER — ANESTHESIA (OUTPATIENT)
Dept: LABOR AND DELIVERY | Facility: HOSPITAL | Age: 27
End: 2017-11-02

## 2017-11-02 ENCOUNTER — HOSPITAL ENCOUNTER (INPATIENT)
Facility: HOSPITAL | Age: 27
LOS: 2 days | Discharge: HOME | End: 2017-11-04
Attending: MIDWIFE | Admitting: OBSTETRICS & GYNECOLOGY

## 2017-11-02 DIAGNOSIS — Z3A.39 39 WEEKS GESTATION OF PREGNANCY: ICD-10-CM

## 2017-11-02 PROBLEM — Z34.90 PREGNANCY: Status: RESOLVED | Noted: 2017-11-02 | Resolved: 2017-11-02

## 2017-11-02 PROBLEM — Z34.90 PREGNANCY: Status: ACTIVE | Noted: 2017-11-02

## 2017-11-02 PROBLEM — Z34.90 SUPERVISION OF NORMAL PREGNANCY: Status: RESOLVED | Noted: 2017-08-03 | Resolved: 2017-11-02

## 2017-11-02 LAB
ABO GROUP BLD: NORMAL
ABO GROUP BLD: NORMAL
AMPHET+METHAMPHET UR QL: NEGATIVE
AMPHETAMINES UR QL: NEGATIVE
BARBITURATES UR QL SCN: NEGATIVE
BASOPHILS # BLD AUTO: 0.03 10*3/MM3 (ref 0–0.2)
BASOPHILS NFR BLD AUTO: 0.4 % (ref 0–2.5)
BENZODIAZ UR QL SCN: NEGATIVE
BLD GP AB SCN SERPL QL: NEGATIVE
BUPRENORPHINE SERPL-MCNC: POSITIVE NG/ML
CANNABINOIDS SERPL QL: NEGATIVE
COCAINE UR QL: NEGATIVE
DEPRECATED RDW RBC AUTO: 45.2 FL (ref 37–54)
EOSINOPHIL # BLD AUTO: 0.06 10*3/MM3 (ref 0–0.7)
EOSINOPHIL NFR BLD AUTO: 0.8 % (ref 0–7)
ERYTHROCYTE [DISTWIDTH] IN BLOOD BY AUTOMATED COUNT: 13.6 % (ref 11.5–14.5)
HCT VFR BLD AUTO: 37.9 % (ref 37–47)
HGB BLD-MCNC: 13 G/DL (ref 12–16)
IMM GRANULOCYTES # BLD: 0.07 10*3/MM3 (ref 0–0.06)
IMM GRANULOCYTES NFR BLD: 1 % (ref 0–0.6)
LYMPHOCYTES # BLD AUTO: 2 10*3/MM3 (ref 0.6–3.4)
LYMPHOCYTES NFR BLD AUTO: 28.2 % (ref 10–50)
MCH RBC QN AUTO: 31.2 PG (ref 27–31)
MCHC RBC AUTO-ENTMCNC: 34.3 G/DL (ref 30–37)
MCV RBC AUTO: 90.9 FL (ref 81–99)
METHADONE UR QL SCN: NEGATIVE
MONOCYTES # BLD AUTO: 0.5 10*3/MM3 (ref 0–0.9)
MONOCYTES NFR BLD AUTO: 7 % (ref 0–12)
NEUTROPHILS # BLD AUTO: 4.44 10*3/MM3 (ref 2–6.9)
NEUTROPHILS NFR BLD AUTO: 62.6 % (ref 37–80)
NRBC BLD MANUAL-RTO: 0 /100 WBC (ref 0–0)
OPIATES UR QL: NEGATIVE
OXYCODONE UR QL SCN: NEGATIVE
PCP UR QL SCN: NEGATIVE
PLATELET # BLD AUTO: 119 10*3/MM3 (ref 130–400)
PMV BLD AUTO: 11.6 FL (ref 6–12)
PROPOXYPH UR QL: NEGATIVE
RBC # BLD AUTO: 4.17 10*6/MM3 (ref 4.2–5.4)
RH BLD: POSITIVE
RH BLD: POSITIVE
TRICYCLICS UR QL SCN: NEGATIVE
WBC NRBC COR # BLD: 7.1 10*3/MM3 (ref 4.8–10.8)

## 2017-11-02 PROCEDURE — 51703 INSERT BLADDER CATH COMPLEX: CPT

## 2017-11-02 PROCEDURE — 85025 COMPLETE CBC W/AUTO DIFF WBC: CPT | Performed by: NURSE PRACTITIONER

## 2017-11-02 PROCEDURE — 3E033VJ INTRODUCTION OF OTHER HORMONE INTO PERIPHERAL VEIN, PERCUTANEOUS APPROACH: ICD-10-PCS | Performed by: NURSE PRACTITIONER

## 2017-11-02 PROCEDURE — 86901 BLOOD TYPING SEROLOGIC RH(D): CPT | Performed by: NURSE PRACTITIONER

## 2017-11-02 PROCEDURE — 80306 DRUG TEST PRSMV INSTRMNT: CPT | Performed by: MIDWIFE

## 2017-11-02 PROCEDURE — 10907ZC DRAINAGE OF AMNIOTIC FLUID, THERAPEUTIC FROM PRODUCTS OF CONCEPTION, VIA NATURAL OR ARTIFICIAL OPENING: ICD-10-PCS | Performed by: NURSE PRACTITIONER

## 2017-11-02 PROCEDURE — 86900 BLOOD TYPING SEROLOGIC ABO: CPT | Performed by: NURSE PRACTITIONER

## 2017-11-02 PROCEDURE — 86901 BLOOD TYPING SEROLOGIC RH(D): CPT

## 2017-11-02 PROCEDURE — 86900 BLOOD TYPING SEROLOGIC ABO: CPT

## 2017-11-02 PROCEDURE — 59409 OBSTETRICAL CARE: CPT | Performed by: NURSE PRACTITIONER

## 2017-11-02 PROCEDURE — 86850 RBC ANTIBODY SCREEN: CPT | Performed by: NURSE PRACTITIONER

## 2017-11-02 PROCEDURE — 25010000002 ROPIVACAINE PER 1 MG: Performed by: NURSE ANESTHETIST, CERTIFIED REGISTERED

## 2017-11-02 PROCEDURE — C1755 CATHETER, INTRASPINAL: HCPCS | Performed by: NURSE ANESTHETIST, CERTIFIED REGISTERED

## 2017-11-02 PROCEDURE — 59025 FETAL NON-STRESS TEST: CPT

## 2017-11-02 PROCEDURE — 25010000002 FENTANYL CITRATE (PF) 250 MCG/5ML SOLUTION 5 ML AMPULE: Performed by: NURSE ANESTHETIST, CERTIFIED REGISTERED

## 2017-11-02 RX ORDER — ONDANSETRON 2 MG/ML
4 INJECTION INTRAMUSCULAR; INTRAVENOUS EVERY 6 HOURS PRN
Status: DISCONTINUED | OUTPATIENT
Start: 2017-11-02 | End: 2017-11-04 | Stop reason: HOSPADM

## 2017-11-02 RX ORDER — ZOLPIDEM TARTRATE 5 MG/1
10 TABLET ORAL NIGHTLY PRN
Status: DISCONTINUED | OUTPATIENT
Start: 2017-11-02 | End: 2017-11-02 | Stop reason: HOSPADM

## 2017-11-02 RX ORDER — LIDOCAINE HYDROCHLORIDE 10 MG/ML
5 INJECTION, SOLUTION INFILTRATION; PERINEURAL AS NEEDED
Status: DISCONTINUED | OUTPATIENT
Start: 2017-11-02 | End: 2017-11-02 | Stop reason: HOSPADM

## 2017-11-02 RX ORDER — PROMETHAZINE HYDROCHLORIDE 12.5 MG/1
12.5 SUPPOSITORY RECTAL EVERY 6 HOURS PRN
Status: DISCONTINUED | OUTPATIENT
Start: 2017-11-02 | End: 2017-11-04 | Stop reason: HOSPADM

## 2017-11-02 RX ORDER — TRISODIUM CITRATE DIHYDRATE AND CITRIC ACID MONOHYDRATE 500; 334 MG/5ML; MG/5ML
30 SOLUTION ORAL ONCE
Status: DISCONTINUED | OUTPATIENT
Start: 2017-11-02 | End: 2017-11-02 | Stop reason: HOSPADM

## 2017-11-02 RX ORDER — CARBOPROST TROMETHAMINE 250 UG/ML
250 INJECTION, SOLUTION INTRAMUSCULAR AS NEEDED
Status: DISCONTINUED | OUTPATIENT
Start: 2017-11-02 | End: 2017-11-04 | Stop reason: HOSPADM

## 2017-11-02 RX ORDER — ONDANSETRON 4 MG/1
4 TABLET, FILM COATED ORAL EVERY 8 HOURS PRN
Status: DISCONTINUED | OUTPATIENT
Start: 2017-11-02 | End: 2017-11-02

## 2017-11-02 RX ORDER — PROMETHAZINE HYDROCHLORIDE 25 MG/ML
12.5 INJECTION, SOLUTION INTRAMUSCULAR; INTRAVENOUS EVERY 6 HOURS PRN
Status: DISCONTINUED | OUTPATIENT
Start: 2017-11-02 | End: 2017-11-02 | Stop reason: HOSPADM

## 2017-11-02 RX ORDER — ACETAMINOPHEN 325 MG/1
650 TABLET ORAL EVERY 4 HOURS PRN
Status: DISCONTINUED | OUTPATIENT
Start: 2017-11-02 | End: 2017-11-02

## 2017-11-02 RX ORDER — ONDANSETRON 2 MG/ML
4 INJECTION INTRAMUSCULAR; INTRAVENOUS EVERY 6 HOURS PRN
Status: DISCONTINUED | OUTPATIENT
Start: 2017-11-02 | End: 2017-11-02

## 2017-11-02 RX ORDER — ACETAMINOPHEN 325 MG/1
650 TABLET ORAL EVERY 4 HOURS PRN
Status: DISCONTINUED | OUTPATIENT
Start: 2017-11-02 | End: 2017-11-02 | Stop reason: HOSPADM

## 2017-11-02 RX ORDER — ACETAMINOPHEN 325 MG/1
650 TABLET ORAL EVERY 4 HOURS PRN
Status: DISCONTINUED | OUTPATIENT
Start: 2017-11-02 | End: 2017-11-04 | Stop reason: HOSPADM

## 2017-11-02 RX ORDER — METHYLERGONOVINE MALEATE 0.2 MG/ML
200 INJECTION INTRAVENOUS ONCE AS NEEDED
Status: DISCONTINUED | OUTPATIENT
Start: 2017-11-02 | End: 2017-11-04 | Stop reason: HOSPADM

## 2017-11-02 RX ORDER — PROMETHAZINE HYDROCHLORIDE 12.5 MG/1
12.5 TABLET ORAL EVERY 6 HOURS PRN
Status: DISCONTINUED | OUTPATIENT
Start: 2017-11-02 | End: 2017-11-02 | Stop reason: HOSPADM

## 2017-11-02 RX ORDER — BISACODYL 10 MG
10 SUPPOSITORY, RECTAL RECTAL DAILY PRN
Status: DISCONTINUED | OUTPATIENT
Start: 2017-11-03 | End: 2017-11-04 | Stop reason: HOSPADM

## 2017-11-02 RX ORDER — PROMETHAZINE HYDROCHLORIDE 12.5 MG/1
12.5 SUPPOSITORY RECTAL EVERY 6 HOURS PRN
Status: DISCONTINUED | OUTPATIENT
Start: 2017-11-02 | End: 2017-11-02 | Stop reason: HOSPADM

## 2017-11-02 RX ORDER — SODIUM CHLORIDE 0.9 % (FLUSH) 0.9 %
1-10 SYRINGE (ML) INJECTION AS NEEDED
Status: DISCONTINUED | OUTPATIENT
Start: 2017-11-02 | End: 2017-11-04 | Stop reason: HOSPADM

## 2017-11-02 RX ORDER — SODIUM CHLORIDE 0.9 % (FLUSH) 0.9 %
1-10 SYRINGE (ML) INJECTION AS NEEDED
Status: DISCONTINUED | OUTPATIENT
Start: 2017-11-02 | End: 2017-11-02 | Stop reason: HOSPADM

## 2017-11-02 RX ORDER — PROMETHAZINE HYDROCHLORIDE 25 MG/1
25 TABLET ORAL EVERY 6 HOURS PRN
Status: DISCONTINUED | OUTPATIENT
Start: 2017-11-02 | End: 2017-11-04 | Stop reason: HOSPADM

## 2017-11-02 RX ORDER — PROMETHAZINE HYDROCHLORIDE 25 MG/ML
12.5 INJECTION, SOLUTION INTRAMUSCULAR; INTRAVENOUS EVERY 6 HOURS PRN
Status: DISCONTINUED | OUTPATIENT
Start: 2017-11-02 | End: 2017-11-04 | Stop reason: HOSPADM

## 2017-11-02 RX ORDER — SODIUM CHLORIDE, SODIUM LACTATE, POTASSIUM CHLORIDE, CALCIUM CHLORIDE 600; 310; 30; 20 MG/100ML; MG/100ML; MG/100ML; MG/100ML
125 INJECTION, SOLUTION INTRAVENOUS CONTINUOUS
Status: DISCONTINUED | OUTPATIENT
Start: 2017-11-02 | End: 2017-11-02

## 2017-11-02 RX ORDER — MORPHINE SULFATE 2 MG/ML
6 INJECTION, SOLUTION INTRAMUSCULAR; INTRAVENOUS EVERY 4 HOURS PRN
Status: DISCONTINUED | OUTPATIENT
Start: 2017-11-02 | End: 2017-11-02

## 2017-11-02 RX ORDER — EPHEDRINE SULFATE 50 MG/ML
5 INJECTION, SOLUTION INTRAVENOUS
Status: DISCONTINUED | OUTPATIENT
Start: 2017-11-02 | End: 2017-11-02 | Stop reason: HOSPADM

## 2017-11-02 RX ORDER — PROMETHAZINE HYDROCHLORIDE 25 MG/ML
12.5 INJECTION, SOLUTION INTRAMUSCULAR; INTRAVENOUS EVERY 6 HOURS PRN
Status: DISCONTINUED | OUTPATIENT
Start: 2017-11-02 | End: 2017-11-02

## 2017-11-02 RX ORDER — IBUPROFEN 600 MG/1
600 TABLET ORAL EVERY 6 HOURS PRN
Status: DISCONTINUED | OUTPATIENT
Start: 2017-11-02 | End: 2017-11-04 | Stop reason: HOSPADM

## 2017-11-02 RX ORDER — HYDROCODONE BITARTRATE AND ACETAMINOPHEN 5; 325 MG/1; MG/1
1 TABLET ORAL EVERY 4 HOURS PRN
Status: DISCONTINUED | OUTPATIENT
Start: 2017-11-02 | End: 2017-11-02

## 2017-11-02 RX ORDER — DOCUSATE SODIUM 100 MG/1
100 CAPSULE, LIQUID FILLED ORAL 2 TIMES DAILY PRN
Status: DISCONTINUED | OUTPATIENT
Start: 2017-11-02 | End: 2017-11-04 | Stop reason: HOSPADM

## 2017-11-02 RX ORDER — ONDANSETRON 4 MG/1
4 TABLET, ORALLY DISINTEGRATING ORAL EVERY 6 HOURS PRN
Status: DISCONTINUED | OUTPATIENT
Start: 2017-11-02 | End: 2017-11-04 | Stop reason: HOSPADM

## 2017-11-02 RX ORDER — TERBUTALINE SULFATE 1 MG/ML
0.25 INJECTION, SOLUTION SUBCUTANEOUS AS NEEDED
Status: DISCONTINUED | OUTPATIENT
Start: 2017-11-02 | End: 2017-11-02 | Stop reason: HOSPADM

## 2017-11-02 RX ORDER — ZOLPIDEM TARTRATE 5 MG/1
10 TABLET ORAL NIGHTLY PRN
Status: DISCONTINUED | OUTPATIENT
Start: 2017-11-02 | End: 2017-11-02

## 2017-11-02 RX ORDER — MISOPROSTOL 200 UG/1
800 TABLET ORAL AS NEEDED
Status: DISCONTINUED | OUTPATIENT
Start: 2017-11-02 | End: 2017-11-04 | Stop reason: HOSPADM

## 2017-11-02 RX ORDER — ZOLPIDEM TARTRATE 5 MG/1
10 TABLET ORAL NIGHTLY PRN
Status: DISCONTINUED | OUTPATIENT
Start: 2017-11-02 | End: 2017-11-04 | Stop reason: HOSPADM

## 2017-11-02 RX ORDER — PROMETHAZINE HYDROCHLORIDE 12.5 MG/1
12.5 SUPPOSITORY RECTAL EVERY 6 HOURS PRN
Status: DISCONTINUED | OUTPATIENT
Start: 2017-11-02 | End: 2017-11-02

## 2017-11-02 RX ORDER — HYDROCODONE BITARTRATE AND ACETAMINOPHEN 5; 325 MG/1; MG/1
1 TABLET ORAL EVERY 4 HOURS PRN
Status: DISCONTINUED | OUTPATIENT
Start: 2017-11-02 | End: 2017-11-04 | Stop reason: HOSPADM

## 2017-11-02 RX ORDER — CALCIUM CARBONATE 200(500)MG
2 TABLET,CHEWABLE ORAL 3 TIMES DAILY PRN
Status: DISCONTINUED | OUTPATIENT
Start: 2017-11-02 | End: 2017-11-04 | Stop reason: HOSPADM

## 2017-11-02 RX ORDER — ONDANSETRON 4 MG/1
4 TABLET, FILM COATED ORAL EVERY 6 HOURS PRN
Status: DISCONTINUED | OUTPATIENT
Start: 2017-11-02 | End: 2017-11-04 | Stop reason: HOSPADM

## 2017-11-02 RX ORDER — MORPHINE SULFATE 10 MG/ML
6 INJECTION, SOLUTION INTRAMUSCULAR; INTRAVENOUS EVERY 4 HOURS PRN
Status: DISCONTINUED | OUTPATIENT
Start: 2017-11-02 | End: 2017-11-02 | Stop reason: RX

## 2017-11-02 RX ORDER — ONDANSETRON 2 MG/ML
4 INJECTION INTRAMUSCULAR; INTRAVENOUS ONCE AS NEEDED
Status: DISCONTINUED | OUTPATIENT
Start: 2017-11-02 | End: 2017-11-02 | Stop reason: HOSPADM

## 2017-11-02 RX ORDER — PROMETHAZINE HYDROCHLORIDE 12.5 MG/1
12.5 TABLET ORAL EVERY 6 HOURS PRN
Status: DISCONTINUED | OUTPATIENT
Start: 2017-11-02 | End: 2017-11-04 | Stop reason: HOSPADM

## 2017-11-02 RX ADMIN — SODIUM CHLORIDE, POTASSIUM CHLORIDE, SODIUM LACTATE AND CALCIUM CHLORIDE 125 ML/HR: 600; 310; 30; 20 INJECTION, SOLUTION INTRAVENOUS at 05:51

## 2017-11-02 RX ADMIN — Medication 2 MILLI-UNITS/MIN: at 05:51

## 2017-11-02 RX ADMIN — SODIUM CHLORIDE, POTASSIUM CHLORIDE, SODIUM LACTATE AND CALCIUM CHLORIDE 1000 ML: 600; 310; 30; 20 INJECTION, SOLUTION INTRAVENOUS at 08:38

## 2017-11-02 RX ADMIN — ROPIVACAINE HYDROCHLORIDE 5 ML: 2 INJECTION, SOLUTION EPIDURAL; INFILTRATION at 09:32

## 2017-11-02 RX ADMIN — ROPIVACAINE HYDROCHLORIDE 5 ML: 2 INJECTION, SOLUTION EPIDURAL; INFILTRATION at 09:26

## 2017-11-02 RX ADMIN — IBUPROFEN 600 MG: 600 TABLET ORAL at 23:05

## 2017-11-02 RX ADMIN — SODIUM CHLORIDE, POTASSIUM CHLORIDE, SODIUM LACTATE AND CALCIUM CHLORIDE 125 ML/HR: 600; 310; 30; 20 INJECTION, SOLUTION INTRAVENOUS at 11:14

## 2017-11-02 NOTE — ANESTHESIA PROCEDURE NOTES
Labor Epidural    Start Time: 11/2/2017 9:22 AM  Stop Time: 11/2/2017 9:34 AM  Preanesthetic Checklist  Completed: patient identified, site marked, surgical consent, pre-op evaluation, timeout performed, IV checked, risks and benefits discussed and monitors and equipment checked  Prep:  Sterile Tech:gloves, cap and sterile barrier  Monitoring:continuous pulse oximetry, EKG and blood pressure monitoring  Epidural Block Procedure:  Approach:midline  Location:L3-L4  Needle Type:Tuohy  Needle Gauge:18 G  Loss of Resistance Medium: saline  Aspiration:negative  Test Dose:negative  Number of Attempts: 1  Post Assessment:  Dressing:secured with tape and occlusive dressing applied  Pt Tolerance:patient tolerated the procedure well with no apparent complications  Complications:no

## 2017-11-02 NOTE — PROGRESS NOTES
"[facility]  Anna Calix  : 1990  MRN: [id number]  CSN: [id number]    History and Physical    Anna Calix is a 27 y.o. year old  with an Estimated Date of Delivery: 17 currently at 39w1d presenting for elective induction of labor.   We discussed in the office re: risks of labor induction including prolongation of labor, increased risks for both  section and operative vaginal birth have been discussed at length.  She also rec'd written info and option to opt out of induction at any time.  This am she states she wants to proceed with induction.    Prenatal care began with Dr. Strickland 2nd trimester and pt transferred care to [facility] at 26 wks gestation.  She has had 7 total visits in our office and wt gain through pregnancy is 26#    Obstetric History       T2      L2     SAB0   TAB0   Ectopic0   Multiple0   Live Births2       # Outcome Date GA Lbr Taj/2nd Weight Sex Delivery Anes PTL Lv   3 Current            2 Term 16 40w0d  6 lb 8 oz (2.948 kg) M Vag-Spont EPI  СЕРГЕЙ   1 Term 08 40w0d  7 lb 8 oz (3.402 kg) M Vag-Spont EPI  СЕРГЕЙ        History reviewed. No pertinent past medical history.  Past Surgical History:   Procedure Laterality Date   • VAGINAL DELIVERY         Review of Systems        Pertinent items are noted in HPI, all other systems reviewed and negative  Allergies   Allergen Reactions   • Penicillins Hives     Smoking status: Former Smoker                                                              Packs/day: 0.00      Years: 0.00      Smokeless status: Never Used                          /79 (BP Location: Left arm, Patient Position: Lying)  Pulse 104  Temp 98.2 °F (36.8 °C) (Oral)   Resp 18  Ht 60\" (152.4 cm)  Wt 124 lb (56.2 kg)  LMP 2017  SpO2 100%  Breastfeeding? Yes  BMI 24.22 kg/m2    Physical Exam       Psych: Altert and oriented to time, place and person  Mood and affect appropriate   General: well developed; " well nourished  no acute distress  HEENT: unremarkable  Musculoskeletal: Normal gait  Full range of motion  Heart: regular rate and rhythm, no murmur  Lungs:  breathing is unlabored  clear to auscultation bilaterally  Back: Negative CVAT  Abdomen: Gravid - soft and non-tender   CTX's q 2-4 min        FHT's 135  EFW 8#  Lower Extremities: LE: Neg edema and no calf tenderness  V/E: 2/60-70% /0/ cephalic                        Prenatal Labs  Lab Results   Component Value Date    HGB 13.0 2017    ABSCRN Negative 2017       Current Labs Reviewed   Urine culture and UDS    Assessment:  1. IUP with an Estimated Date of Delivery: 17  2. Induction of labor because of elective at term per patient request  3. Group B strep status: negative  4. FHT's reassuring   5. Subutex thearpy  6. + Hep C     Plan:    1.  Admit to  with Pitocin drip - RSO   2.  AROM fluid clear  3.  Epidural optional   4.  Anticipate    5.  Encourage questions & answered  .      Veronica Segovia CNM  2017  8:11 AM

## 2017-11-02 NOTE — L&D DELIVERY NOTE
[facility]  Vaginal Delivery Note    Delivery     Delivery: Vaginal, Spontaneous Delivery     YOB: 2017    Time of Birth: 2:19 PM      Anesthesia: Epidural     Delivering clinician: Veronica Segovia    Forceps?   No   Vacuum? No    Shoulder dystocia present: No        Delivery narrative:     of viable male OP position - over an intact perineum - spontaneous lusty cry and to moms abdomen - delayed cord cutting - then clamped x 2 and cut per friend  Spontaneous delivery of placenta - intact - 20 units pitocin to IVF's F/F with message   Vaginal inspection - intact -    cc    Infant    Findings: male  infant     Infant observations: Weight: 8 lb 2 oz (3.685 kg)   Length:    in  Observations/Comments:  None noted at this time      Apgars: 8   @ 1 minute /    9   @ 5 minutes   Infant Name:      Placenta, Cord, and Fluid    Placenta delivered  Spontaneous  at     2:26 PM     Cord: 3 vessels  present.   Nuchal Cord?  no   Cord blood obtained: Yes    Cord gases obtained:  No    Cord gas results: Venous:  No results found for: PHCVEN    Arterial:  No results found for: PHCART     Repair    Episiotomy: None    Lacerations: No   Estimated Blood Loss:       Suture used for repair: N/A     Complications  none    Disposition  Mother to Mother Baby/Postpartum  in stable condition currently.  Baby to remains with mom  in stable condition currently.      Veronica Segovia CNM  17  4:15 PM

## 2017-11-02 NOTE — PROGRESS NOTES
RN reported FHT's with variables   Pt comfortable with epidural in   O.  BP's - WNL         Ctx's close        FHT's appear with variables & late decels       V\E 4 cm 80% 0   P.  Will turn pit off, bolus with IVF's and reposition.         Close evaluation.   Note: Discussed with pt & family at bedside - encourage questions & answered

## 2017-11-02 NOTE — NON STRESS TEST
"  Anna Calix, a  at 39w1d with an AG of 2017, by Other Basis, was seen at [facility] for a nonstress test.    Chief Complaint   Patient presents with   • Scheduled Induction     \" Im here for my induction\"       Interpretation A  Nonstress Test Interpretation A: Reactive (17 0538 : Pooja Choudhury RN)          "

## 2017-11-02 NOTE — PROGRESS NOTES
Ctx's appear q 2-3 min   FHT's 125 + accels and variability   V/E 4-5 /85%/0   IUPC placed to confirm adequate quality   A.  IUP active labor       FHT's reassuring  P.  IUPC          Start pitocin and increase q 15 -20 min to = MVU to 200-250          Discussed with pt and no questions at this time

## 2017-11-02 NOTE — PLAN OF CARE
Problem: Patient Care Overview (Adult)  Goal: Adult Individualization and Mutuality  Outcome: Ongoing (interventions implemented as appropriate)    11/02/17 0553   Individualization   Patient Specific Preferences breast feed   Patient Specific Goals skin to skin   Patient Specific Interventions epidural   Mutuality/Individual Preferences   What Anxieties, Fears or Concerns Do You Have About Your Health or Care? none   What Questions Do You Have About Your Health or Care? none   What Information Would Help Us Give You More Personalized Care? none       Goal: Discharge Needs Assessment  Outcome: Ongoing (interventions implemented as appropriate)    11/02/17 1817   Discharge Needs Assessment   Concerns To Be Addressed no discharge needs identified   Readmission Within The Last 30 Days no previous admission in last 30 days   Equipment Needed After Discharge none   Discharge Disposition home or self-care   Current Health   Anticipated Changes Related to Illness none   Self-Care   Equipment Currently Used at Home none   Living Environment   Transportation Available none         Problem: Labor (Cervical Ripen, Induct, Augment) (Adult,Obstetrics,Pediatric)  Goal: Signs and Symptoms of Listed Potential Problems Will be Absent or Manageable (Labor)  Outcome: Outcome(s) achieved Date Met:  11/02/17  Goal: Signs and Symptoms of Listed Potential Problems Will be Absent or Manageable (Labor)  Outcome: Outcome(s) achieved Date Met:  11/02/17    Problem: Postpartum, Vaginal Delivery (Adult)  Goal: Signs and Symptoms of Listed Potential Problems Will be Absent or Manageable (Postpartum, Vaginal Delivery)  Outcome: Ongoing (interventions implemented as appropriate)    11/02/17 1817   Postpartum, Vaginal Delivery   Problems Assessed (Postpartum Vaginal Delivery) all   Problems Present (Postpartum Vaginal Delivery) none

## 2017-11-02 NOTE — PLAN OF CARE
Problem: Patient Care Overview (Adult)  Goal: Adult Individualization and Mutuality  Outcome: Ongoing (interventions implemented as appropriate)  Goal: Discharge Needs Assessment  Outcome: Ongoing (interventions implemented as appropriate)    Problem: Labor (Cervical Ripen, Induct, Augment) (Adult,Obstetrics,Pediatric)  Goal: Signs and Symptoms of Listed Potential Problems Will be Absent or Manageable (Labor)  Outcome: Ongoing (interventions implemented as appropriate)  Goal: Signs and Symptoms of Listed Potential Problems Will be Absent or Manageable (Labor)  Outcome: Ongoing (interventions implemented as appropriate)

## 2017-11-02 NOTE — ANESTHESIA PREPROCEDURE EVALUATION
Anesthesia Evaluation     Patient summary reviewed and Nursing notes reviewed   NPO Solid Status: > 6 hours  NPO Liquid Status: > 6 hours     Airway   Mallampati: II  TM distance: >3 FB  Neck ROM: full  no difficulty expected  Dental      Pulmonary    Cardiovascular   Exercise tolerance: good (4-7 METS)    Rhythm: regular  Rate: normal        Neuro/Psych  GI/Hepatic/Renal/Endo    (+)  hepatitis C,     Musculoskeletal     Abdominal    Substance History   (+) drug use     OB/GYN    (+) Pregnant,         Other                                        Anesthesia Plan    ASA 2 - emergent     epidural     Anesthetic plan and risks discussed with patient.    Plan discussed with CRNA.

## 2017-11-03 LAB
BASOPHILS # BLD AUTO: 0.05 10*3/MM3 (ref 0–0.2)
BASOPHILS NFR BLD AUTO: 0.5 % (ref 0–2.5)
DEPRECATED RDW RBC AUTO: 45.2 FL (ref 37–54)
EOSINOPHIL # BLD AUTO: 0.06 10*3/MM3 (ref 0–0.7)
EOSINOPHIL NFR BLD AUTO: 0.6 % (ref 0–7)
ERYTHROCYTE [DISTWIDTH] IN BLOOD BY AUTOMATED COUNT: 13.6 % (ref 11.5–14.5)
HCT VFR BLD AUTO: 35.6 % (ref 37–47)
HGB BLD-MCNC: 12.4 G/DL (ref 12–16)
IMM GRANULOCYTES # BLD: 0.05 10*3/MM3 (ref 0–0.06)
IMM GRANULOCYTES NFR BLD: 0.5 % (ref 0–0.6)
LYMPHOCYTES # BLD AUTO: 2.34 10*3/MM3 (ref 0.6–3.4)
LYMPHOCYTES NFR BLD AUTO: 23.3 % (ref 10–50)
MCH RBC QN AUTO: 31.9 PG (ref 27–31)
MCHC RBC AUTO-ENTMCNC: 34.8 G/DL (ref 30–37)
MCV RBC AUTO: 91.5 FL (ref 81–99)
MONOCYTES # BLD AUTO: 0.56 10*3/MM3 (ref 0–0.9)
MONOCYTES NFR BLD AUTO: 5.6 % (ref 0–12)
NEUTROPHILS # BLD AUTO: 6.97 10*3/MM3 (ref 2–6.9)
NEUTROPHILS NFR BLD AUTO: 69.5 % (ref 37–80)
NRBC BLD MANUAL-RTO: 0 /100 WBC (ref 0–0)
PLATELET # BLD AUTO: 133 10*3/MM3 (ref 130–400)
PMV BLD AUTO: 12 FL (ref 6–12)
RBC # BLD AUTO: 3.89 10*6/MM3 (ref 4.2–5.4)
WBC NRBC COR # BLD: 10.03 10*3/MM3 (ref 4.8–10.8)

## 2017-11-03 PROCEDURE — 25010000002 TDAP 5-2.5-18.5 LF-MCG/0.5 SUSPENSION: Performed by: NURSE PRACTITIONER

## 2017-11-03 PROCEDURE — 90471 IMMUNIZATION ADMIN: CPT | Performed by: NURSE PRACTITIONER

## 2017-11-03 PROCEDURE — 99231 SBSQ HOSP IP/OBS SF/LOW 25: CPT | Performed by: MIDWIFE

## 2017-11-03 PROCEDURE — 25010000002 INFLUENZA VAC SUBUNIT QUAD 0.5 ML SUSPENSION PREFILLED SYRINGE: Performed by: MIDWIFE

## 2017-11-03 PROCEDURE — G0008 ADMIN INFLUENZA VIRUS VAC: HCPCS | Performed by: MIDWIFE

## 2017-11-03 PROCEDURE — 85025 COMPLETE CBC W/AUTO DIFF WBC: CPT | Performed by: NURSE PRACTITIONER

## 2017-11-03 PROCEDURE — 90661 CCIIV3 VAC ABX FR 0.5 ML IM: CPT | Performed by: MIDWIFE

## 2017-11-03 PROCEDURE — 90715 TDAP VACCINE 7 YRS/> IM: CPT | Performed by: NURSE PRACTITIONER

## 2017-11-03 RX ADMIN — IBUPROFEN 600 MG: 600 TABLET ORAL at 07:36

## 2017-11-03 RX ADMIN — A/SINGAPORE/GP1908/2015 IVR-180 (H1N1) (AN A/MICHIGAN/45/2015-LIKE VIRUS), A/SINGAPORE/GP2050/2015 (H3N2) (AN A/HONG KONG/4801/2014 - LIKE VIRUS), B/UTAH/9/2014 (A B/PHUKET/3073/2013-LIKE VIRUS), B/HONG KONG/259/2010 (A B/BRISBANE/60/08-LIKE VIRUS) 0.5 ML: 15; 15; 15; 15 INJECTION, SUSPENSION INTRAMUSCULAR at 09:49

## 2017-11-03 RX ADMIN — MEASLES, MUMPS, AND RUBELLA VIRUS VACCINE LIVE 0.5 ML: 1000; 12500; 1000 INJECTION, POWDER, LYOPHILIZED, FOR SUSPENSION SUBCUTANEOUS at 10:18

## 2017-11-03 RX ADMIN — TETANUS TOXOID, REDUCED DIPHTHERIA TOXOID AND ACELLULAR PERTUSSIS VACCINE, ADSORBED 0.5 ML: 5; 2.5; 8; 8; 2.5 SUSPENSION INTRAMUSCULAR at 09:52

## 2017-11-03 NOTE — PROGRESS NOTES
[facility]  : 1990  MRN: [id number]  CSN: [id number]    Postpartum Day #1  Subjective   Her pain is well controlled.  Vaginal bleeding is appropriate amount.      Objective     Min/max vitals past 24 hours:   Temp  Min: 98 °F (36.7 °C)  Max: 99.2 °F (37.3 °C)  BP  Min: 63/41  Max: 139/82  Pulse  Min: 55  Max: 116  Resp  Min: 18  Max: 22        General: well developed; well nourished  no acute distress   Abdomen: soft, non-tender; no masses  fundus firm and non-tender   Pelvic: Not performed   Ext: Calves NT     Lab Results   Component Value Date    WBC 10.03 2017    HGB 12.4 2017    HCT 35.6 (L) 2017    MCV 91.5 2017     2017    RH Positive 2017        Assessment   1. Postpartum Day #1 S/P vaginal delivery     Plan   1. Continue routine postpartum care    Sue Sultana, SURINDER  11/3/2017  8:42 AM

## 2017-11-03 NOTE — PROGRESS NOTES
"Patient: Anna Calix  * No surgery found *  Anesthesia type: [unfilled]    Patient location: Labor and Delivery  Last vitals: /62 (BP Location: Right arm, Patient Position: Lying)  Pulse 63  Temp 98 °F (36.7 °C) (Oral)   Resp 18  Ht 60\" (152.4 cm)  Wt 124 lb (56.2 kg)  LMP 02/01/2017  SpO2 100%  Breastfeeding? Unknown  BMI 24.22 kg/m2  Level of consciousness: awake, alert and oriented    Post-anesthesia pain: adequate analgesia  Airway patency: patent  Respiratory: unassisted  Cardiovascular: stable and blood pressure at baseline  Hydration: euvolemic    Anesthetic complications: no     "

## 2017-11-03 NOTE — PLAN OF CARE
Problem: Patient Care Overview (Adult)  Goal: Plan of Care Review  Outcome: Ongoing (interventions implemented as appropriate)    17 1425   Coping/Psychosocial Response Interventions   Plan Of Care Reviewed With patient   Patient Care Overview   Progress improving   Outcome Evaluation   Outcome Summary/Follow up Plan VSS, routine PP care       Goal: Adult Individualization and Mutuality  Outcome: Ongoing (interventions implemented as appropriate)  Goal: Discharge Needs Assessment  Outcome: Ongoing (interventions implemented as appropriate)    Problem: Postpartum, Vaginal Delivery (Adult)  Goal: Signs and Symptoms of Listed Potential Problems Will be Absent or Manageable (Postpartum, Vaginal Delivery)  Outcome: Ongoing (interventions implemented as appropriate)    Problem: Breastfeeding (Adult,NICU,,Obstetrics,Pediatric)  Goal: Signs and Symptoms of Listed Potential Problems Will be Absent or Manageable (Breastfeeding)  Outcome: Ongoing (interventions implemented as appropriate)

## 2017-11-03 NOTE — PLAN OF CARE
Problem: Patient Care Overview (Adult)  Goal: Plan of Care Review  Outcome: Ongoing (interventions implemented as appropriate)    17 0524   Coping/Psychosocial Response Interventions   Plan Of Care Reviewed With patient   Patient Care Overview   Progress improving   Outcome Evaluation   Outcome Summary/Follow up Plan vss routine pp care, social service consult       Goal: Adult Individualization and Mutuality  Outcome: Ongoing (interventions implemented as appropriate)  Goal: Discharge Needs Assessment  Outcome: Ongoing (interventions implemented as appropriate)    Problem: Postpartum, Vaginal Delivery (Adult)  Goal: Signs and Symptoms of Listed Potential Problems Will be Absent or Manageable (Postpartum, Vaginal Delivery)  Outcome: Ongoing (interventions implemented as appropriate)    Problem: Breastfeeding (Adult,NICU,,Obstetrics,Pediatric)  Goal: Signs and Symptoms of Listed Potential Problems Will be Absent or Manageable (Breastfeeding)  Outcome: Ongoing (interventions implemented as appropriate)

## 2017-11-04 VITALS
SYSTOLIC BLOOD PRESSURE: 101 MMHG | WEIGHT: 124 LBS | HEIGHT: 60 IN | DIASTOLIC BLOOD PRESSURE: 58 MMHG | TEMPERATURE: 98.2 F | RESPIRATION RATE: 15 BRPM | OXYGEN SATURATION: 100 % | BODY MASS INDEX: 24.35 KG/M2 | HEART RATE: 66 BPM

## 2017-11-04 PROCEDURE — 99238 HOSP IP/OBS DSCHRG MGMT 30/<: CPT | Performed by: MIDWIFE

## 2017-11-04 RX ORDER — LANOLIN
CREAM (GRAM) TOPICAL
Status: DISPENSED
Start: 2017-11-04 | End: 2017-11-04

## 2017-11-04 RX ORDER — LANOLIN
CREAM (GRAM) TOPICAL AS NEEDED
Status: DISCONTINUED | OUTPATIENT
Start: 2017-11-04 | End: 2017-11-04 | Stop reason: HOSPADM

## 2017-11-04 RX ORDER — IBUPROFEN 600 MG/1
600 TABLET ORAL EVERY 6 HOURS PRN
Qty: 60 TABLET | Refills: 0 | Status: ON HOLD | OUTPATIENT
Start: 2017-11-04 | End: 2023-01-05

## 2017-11-04 RX ADMIN — IBUPROFEN 600 MG: 600 TABLET ORAL at 10:55

## 2017-11-04 RX ADMIN — IBUPROFEN 600 MG: 600 TABLET ORAL at 00:18

## 2017-11-04 NOTE — DISCHARGE SUMMARY
Discharge Summary     Zacarias Calix  : 1990  MRN: [id number]  CSN: [id number]    Date of Admission: 2017   Date of Discharge:  2017   Delivering Physician: Veronica Segovia        Admission Diagnosis: 1. Pregnancy [Z34.90]   Discharge Diagnosis: 1. Same as above plus  2. Pregnancy at 39w1d - delivered       Procedures: 2017  - Vaginal, Spontaneous Delivery       Hospital Course  Patient is a 27 y.o.  who at 39w1d had a vaginal birth.  Her postpartum course was without complications.  On PPD #2 she was ready for discharge.  She had normal lochia and pain well controlled with oral medications. She is taking Ibuprofen for pain. Baby had some difficulty latching therefore she is pumping and giving baby the bottle.    Infant  male  fetus weighing 8 lb 2 oz (3.685 kg)   Apgars -  8  @ 1 minute /  9  @ 5 minutes.    Discharge labs  Lab Results   Component Value Date    WBC 10.03 2017    HGB 12.4 2017    HCT 35.6 (L) 2017     2017       Discharge Medications   Alejo Calixki   Home Medication Instructions SHOSHANA:[id number]    Printed on:17 0911   Medication Information                      buprenorphine (SUBUTEX) 8 MG sublingual tablet SL tablet               ibuprofen (ADVIL,MOTRIN) 600 MG tablet  Take 1 tablet by mouth Every 6 (Six) Hours As Needed for Mild Pain  or Moderate Pain  (May be given in combination with acetaminophen).             Prenatal Vit-Fe Fumarate-FA (PRENATAL, CLASSIC, VITAMIN) 28-0.8 MG tablet tablet  Take  by mouth Daily.                 Discharge Disposition Home or Self Care   Condition on Discharge: good   Follow-up: 6 weeks with SURINDER Payan  2017

## 2023-01-05 ENCOUNTER — HOSPITAL ENCOUNTER (INPATIENT)
Facility: HOSPITAL | Age: 33
LOS: 3 days | Discharge: LEFT BEFORE DISCHARGE | DRG: 894 | End: 2023-01-08
Attending: PSYCHIATRY & NEUROLOGY | Admitting: PSYCHIATRY & NEUROLOGY
Payer: MEDICAID

## 2023-01-05 ENCOUNTER — HOSPITAL ENCOUNTER (EMERGENCY)
Facility: HOSPITAL | Age: 33
Discharge: PSYCHIATRIC OR SUBSTANCE USE FACILITY | DRG: 894 | End: 2023-01-05
Attending: STUDENT IN AN ORGANIZED HEALTH CARE EDUCATION/TRAINING PROGRAM | Admitting: STUDENT IN AN ORGANIZED HEALTH CARE EDUCATION/TRAINING PROGRAM
Payer: MEDICAID

## 2023-01-05 VITALS
DIASTOLIC BLOOD PRESSURE: 66 MMHG | SYSTOLIC BLOOD PRESSURE: 109 MMHG | RESPIRATION RATE: 18 BRPM | TEMPERATURE: 98.1 F | HEART RATE: 82 BPM | OXYGEN SATURATION: 98 % | HEIGHT: 60 IN | BODY MASS INDEX: 23.56 KG/M2 | WEIGHT: 120 LBS

## 2023-01-05 DIAGNOSIS — F19.10 SUBSTANCE ABUSE: Primary | ICD-10-CM

## 2023-01-05 LAB
ALBUMIN SERPL-MCNC: 4.1 G/DL (ref 3.5–5.2)
ALBUMIN/GLOB SERPL: 1.3 G/DL
ALP SERPL-CCNC: 83 U/L (ref 39–117)
ALT SERPL W P-5'-P-CCNC: 54 U/L (ref 1–33)
AMPHET+METHAMPHET UR QL: POSITIVE
AMPHETAMINES UR QL: POSITIVE
ANION GAP SERPL CALCULATED.3IONS-SCNC: 9.7 MMOL/L (ref 5–15)
AST SERPL-CCNC: 44 U/L (ref 1–32)
B-HCG UR QL: NEGATIVE
BACTERIA UR QL AUTO: ABNORMAL /HPF
BARBITURATES UR QL SCN: NEGATIVE
BASOPHILS # BLD AUTO: 0.05 10*3/MM3 (ref 0–0.2)
BASOPHILS NFR BLD AUTO: 0.6 % (ref 0–1.5)
BENZODIAZ UR QL SCN: POSITIVE
BILIRUB SERPL-MCNC: 0.3 MG/DL (ref 0–1.2)
BILIRUB UR QL STRIP: NEGATIVE
BUN SERPL-MCNC: 11 MG/DL (ref 6–20)
BUN/CREAT SERPL: 15.9 (ref 7–25)
BUPRENORPHINE SERPL-MCNC: NEGATIVE NG/ML
CALCIUM SPEC-SCNC: 9.6 MG/DL (ref 8.6–10.5)
CANNABINOIDS SERPL QL: NEGATIVE
CHLORIDE SERPL-SCNC: 106 MMOL/L (ref 98–107)
CLARITY UR: ABNORMAL
CO2 SERPL-SCNC: 23.3 MMOL/L (ref 22–29)
COCAINE UR QL: NEGATIVE
COLOR UR: YELLOW
CREAT SERPL-MCNC: 0.69 MG/DL (ref 0.57–1)
DEPRECATED RDW RBC AUTO: 44.4 FL (ref 37–54)
EGFRCR SERPLBLD CKD-EPI 2021: 118.4 ML/MIN/1.73
EOSINOPHIL # BLD AUTO: 0.01 10*3/MM3 (ref 0–0.4)
EOSINOPHIL NFR BLD AUTO: 0.1 % (ref 0.3–6.2)
ERYTHROCYTE [DISTWIDTH] IN BLOOD BY AUTOMATED COUNT: 13.5 % (ref 12.3–15.4)
ETHANOL BLD-MCNC: <10 MG/DL (ref 0–10)
ETHANOL UR QL: <0.01 %
FLUAV RNA RESP QL NAA+PROBE: NOT DETECTED
FLUBV RNA RESP QL NAA+PROBE: NOT DETECTED
GLOBULIN UR ELPH-MCNC: 3.2 GM/DL
GLUCOSE SERPL-MCNC: 110 MG/DL (ref 65–99)
GLUCOSE UR STRIP-MCNC: NEGATIVE MG/DL
HCT VFR BLD AUTO: 41.1 % (ref 34–46.6)
HGB BLD-MCNC: 13.7 G/DL (ref 12–15.9)
HGB UR QL STRIP.AUTO: NEGATIVE
HYALINE CASTS UR QL AUTO: ABNORMAL /LPF
IMM GRANULOCYTES # BLD AUTO: 0.02 10*3/MM3 (ref 0–0.05)
IMM GRANULOCYTES NFR BLD AUTO: 0.2 % (ref 0–0.5)
KETONES UR QL STRIP: NEGATIVE
LEUKOCYTE ESTERASE UR QL STRIP.AUTO: ABNORMAL
LYMPHOCYTES # BLD AUTO: 1.02 10*3/MM3 (ref 0.7–3.1)
LYMPHOCYTES NFR BLD AUTO: 11.7 % (ref 19.6–45.3)
MCH RBC QN AUTO: 29.9 PG (ref 26.6–33)
MCHC RBC AUTO-ENTMCNC: 33.3 G/DL (ref 31.5–35.7)
MCV RBC AUTO: 89.7 FL (ref 79–97)
METHADONE UR QL SCN: NEGATIVE
MONOCYTES # BLD AUTO: 0.13 10*3/MM3 (ref 0.1–0.9)
MONOCYTES NFR BLD AUTO: 1.5 % (ref 5–12)
NEUTROPHILS NFR BLD AUTO: 7.52 10*3/MM3 (ref 1.7–7)
NEUTROPHILS NFR BLD AUTO: 85.9 % (ref 42.7–76)
NITRITE UR QL STRIP: NEGATIVE
NRBC BLD AUTO-RTO: 0 /100 WBC (ref 0–0.2)
OPIATES UR QL: NEGATIVE
OXYCODONE UR QL SCN: NEGATIVE
PCP UR QL SCN: NEGATIVE
PH UR STRIP.AUTO: 6.5 [PH] (ref 5–8)
PLATELET # BLD AUTO: 333 10*3/MM3 (ref 140–450)
PMV BLD AUTO: 9.9 FL (ref 6–12)
POTASSIUM SERPL-SCNC: 4.2 MMOL/L (ref 3.5–5.2)
PROPOXYPH UR QL: NEGATIVE
PROT SERPL-MCNC: 7.3 G/DL (ref 6–8.5)
PROT UR QL STRIP: NEGATIVE
RBC # BLD AUTO: 4.58 10*6/MM3 (ref 3.77–5.28)
RBC # UR STRIP: ABNORMAL /HPF
REF LAB TEST METHOD: ABNORMAL
SARS-COV-2 RNA RESP QL NAA+PROBE: NOT DETECTED
SODIUM SERPL-SCNC: 139 MMOL/L (ref 136–145)
SP GR UR STRIP: 1.02 (ref 1–1.03)
SQUAMOUS #/AREA URNS HPF: ABNORMAL /HPF
TRICYCLICS UR QL SCN: NEGATIVE
UROBILINOGEN UR QL STRIP: ABNORMAL
WBC # UR STRIP: ABNORMAL /HPF
WBC NRBC COR # BLD: 8.75 10*3/MM3 (ref 3.4–10.8)

## 2023-01-05 PROCEDURE — 99284 EMERGENCY DEPT VISIT MOD MDM: CPT

## 2023-01-05 PROCEDURE — C9803 HOPD COVID-19 SPEC COLLECT: HCPCS

## 2023-01-05 PROCEDURE — 93005 ELECTROCARDIOGRAM TRACING: CPT | Performed by: PSYCHIATRY & NEUROLOGY

## 2023-01-05 PROCEDURE — 87636 SARSCOV2 & INF A&B AMP PRB: CPT | Performed by: PHYSICIAN ASSISTANT

## 2023-01-05 PROCEDURE — 81001 URINALYSIS AUTO W/SCOPE: CPT | Performed by: PHYSICIAN ASSISTANT

## 2023-01-05 PROCEDURE — 99285 EMERGENCY DEPT VISIT HI MDM: CPT

## 2023-01-05 PROCEDURE — 85025 COMPLETE CBC W/AUTO DIFF WBC: CPT | Performed by: PHYSICIAN ASSISTANT

## 2023-01-05 PROCEDURE — 81025 URINE PREGNANCY TEST: CPT | Performed by: PHYSICIAN ASSISTANT

## 2023-01-05 PROCEDURE — 80053 COMPREHEN METABOLIC PANEL: CPT | Performed by: PHYSICIAN ASSISTANT

## 2023-01-05 PROCEDURE — HZ2ZZZZ DETOXIFICATION SERVICES FOR SUBSTANCE ABUSE TREATMENT: ICD-10-PCS | Performed by: PSYCHIATRY & NEUROLOGY

## 2023-01-05 PROCEDURE — 36415 COLL VENOUS BLD VENIPUNCTURE: CPT

## 2023-01-05 PROCEDURE — 80306 DRUG TEST PRSMV INSTRMNT: CPT | Performed by: PHYSICIAN ASSISTANT

## 2023-01-05 PROCEDURE — 87086 URINE CULTURE/COLONY COUNT: CPT | Performed by: PHYSICIAN ASSISTANT

## 2023-01-05 PROCEDURE — 82077 ASSAY SPEC XCP UR&BREATH IA: CPT | Performed by: PHYSICIAN ASSISTANT

## 2023-01-05 RX ORDER — ECHINACEA PURPUREA EXTRACT 125 MG
2 TABLET ORAL AS NEEDED
Status: DISCONTINUED | OUTPATIENT
Start: 2023-01-05 | End: 2023-01-08 | Stop reason: HOSPADM

## 2023-01-05 RX ORDER — LORAZEPAM 1 MG/1
1 TABLET ORAL EVERY 4 HOURS PRN
Status: DISCONTINUED | OUTPATIENT
Start: 2023-01-08 | End: 2023-01-08 | Stop reason: HOSPADM

## 2023-01-05 RX ORDER — TRAZODONE HYDROCHLORIDE 50 MG/1
50 TABLET ORAL NIGHTLY PRN
Status: DISCONTINUED | OUTPATIENT
Start: 2023-01-05 | End: 2023-01-08 | Stop reason: HOSPADM

## 2023-01-05 RX ORDER — ONDANSETRON 4 MG/1
4 TABLET, FILM COATED ORAL EVERY 6 HOURS PRN
Status: DISCONTINUED | OUTPATIENT
Start: 2023-01-05 | End: 2023-01-08 | Stop reason: HOSPADM

## 2023-01-05 RX ORDER — LORAZEPAM 0.5 MG/1
0.5 TABLET ORAL
Status: DISCONTINUED | OUTPATIENT
Start: 2023-01-09 | End: 2023-01-08 | Stop reason: HOSPADM

## 2023-01-05 RX ORDER — NICOTINE 21 MG/24HR
1 PATCH, TRANSDERMAL 24 HOURS TRANSDERMAL
Status: DISCONTINUED | OUTPATIENT
Start: 2023-01-05 | End: 2023-01-08 | Stop reason: HOSPADM

## 2023-01-05 RX ORDER — BENZTROPINE MESYLATE 1 MG/1
2 TABLET ORAL ONCE AS NEEDED
Status: DISCONTINUED | OUTPATIENT
Start: 2023-01-05 | End: 2023-01-08 | Stop reason: HOSPADM

## 2023-01-05 RX ORDER — CLONIDINE HYDROCHLORIDE 0.1 MG/1
0.1 TABLET ORAL 2 TIMES DAILY PRN
Status: DISCONTINUED | OUTPATIENT
Start: 2023-01-08 | End: 2023-01-08 | Stop reason: HOSPADM

## 2023-01-05 RX ORDER — CLONIDINE HYDROCHLORIDE 0.1 MG/1
0.1 TABLET ORAL 4 TIMES DAILY PRN
Status: DISCONTINUED | OUTPATIENT
Start: 2023-01-05 | End: 2023-01-05

## 2023-01-05 RX ORDER — HYDROXYZINE HYDROCHLORIDE 25 MG/1
50 TABLET, FILM COATED ORAL EVERY 6 HOURS PRN
Status: DISCONTINUED | OUTPATIENT
Start: 2023-01-05 | End: 2023-01-08 | Stop reason: HOSPADM

## 2023-01-05 RX ORDER — LORAZEPAM 2 MG/1
2 TABLET ORAL EVERY 4 HOURS PRN
Status: ACTIVE | OUTPATIENT
Start: 2023-01-06 | End: 2023-01-07

## 2023-01-05 RX ORDER — LORAZEPAM 1 MG/1
1 TABLET ORAL
Status: DISCONTINUED | OUTPATIENT
Start: 2023-01-08 | End: 2023-01-08 | Stop reason: HOSPADM

## 2023-01-05 RX ORDER — BENZONATATE 100 MG/1
100 CAPSULE ORAL 3 TIMES DAILY PRN
Status: DISCONTINUED | OUTPATIENT
Start: 2023-01-05 | End: 2023-01-08 | Stop reason: HOSPADM

## 2023-01-05 RX ORDER — HYDRALAZINE HYDROCHLORIDE 25 MG/1
25 TABLET, FILM COATED ORAL DAILY PRN
Status: DISCONTINUED | OUTPATIENT
Start: 2023-01-05 | End: 2023-01-08 | Stop reason: HOSPADM

## 2023-01-05 RX ORDER — LORAZEPAM 0.5 MG/1
0.5 TABLET ORAL EVERY 4 HOURS PRN
Status: DISCONTINUED | OUTPATIENT
Start: 2023-01-09 | End: 2023-01-08 | Stop reason: HOSPADM

## 2023-01-05 RX ORDER — LOPERAMIDE HYDROCHLORIDE 2 MG/1
2 CAPSULE ORAL
Status: DISCONTINUED | OUTPATIENT
Start: 2023-01-05 | End: 2023-01-08 | Stop reason: HOSPADM

## 2023-01-05 RX ORDER — CLONIDINE HYDROCHLORIDE 0.1 MG/1
0.1 TABLET ORAL 4 TIMES DAILY PRN
Status: ACTIVE | OUTPATIENT
Start: 2023-01-05 | End: 2023-01-06

## 2023-01-05 RX ORDER — DICYCLOMINE HYDROCHLORIDE 10 MG/1
10 CAPSULE ORAL 3 TIMES DAILY PRN
Status: DISCONTINUED | OUTPATIENT
Start: 2023-01-05 | End: 2023-01-08 | Stop reason: HOSPADM

## 2023-01-05 RX ORDER — LORAZEPAM 2 MG/1
2 TABLET ORAL
Status: COMPLETED | OUTPATIENT
Start: 2023-01-06 | End: 2023-01-06

## 2023-01-05 RX ORDER — CLONIDINE HYDROCHLORIDE 0.1 MG/1
0.1 TABLET ORAL 3 TIMES DAILY PRN
Status: ACTIVE | OUTPATIENT
Start: 2023-01-07 | End: 2023-01-08

## 2023-01-05 RX ORDER — IBUPROFEN 400 MG/1
400 TABLET ORAL EVERY 6 HOURS PRN
Status: DISCONTINUED | OUTPATIENT
Start: 2023-01-05 | End: 2023-01-08 | Stop reason: HOSPADM

## 2023-01-05 RX ORDER — LORAZEPAM 2 MG/1
2 TABLET ORAL
Status: ACTIVE | OUTPATIENT
Start: 2023-01-05 | End: 2023-01-06

## 2023-01-05 RX ORDER — CYCLOBENZAPRINE HCL 10 MG
10 TABLET ORAL 3 TIMES DAILY PRN
Status: DISCONTINUED | OUTPATIENT
Start: 2023-01-05 | End: 2023-01-08 | Stop reason: HOSPADM

## 2023-01-05 RX ORDER — ALUMINA, MAGNESIA, AND SIMETHICONE 2400; 2400; 240 MG/30ML; MG/30ML; MG/30ML
15 SUSPENSION ORAL EVERY 6 HOURS PRN
Status: DISCONTINUED | OUTPATIENT
Start: 2023-01-05 | End: 2023-01-08 | Stop reason: HOSPADM

## 2023-01-05 RX ORDER — CLONIDINE HYDROCHLORIDE 0.1 MG/1
0.1 TABLET ORAL ONCE AS NEEDED
Status: DISCONTINUED | OUTPATIENT
Start: 2023-01-09 | End: 2023-01-08 | Stop reason: HOSPADM

## 2023-01-05 RX ORDER — CLONIDINE HYDROCHLORIDE 0.1 MG/1
0.1 TABLET ORAL ONCE
Status: COMPLETED | OUTPATIENT
Start: 2023-01-05 | End: 2023-01-05

## 2023-01-05 RX ORDER — BENZTROPINE MESYLATE 1 MG/ML
1 INJECTION INTRAMUSCULAR; INTRAVENOUS ONCE AS NEEDED
Status: DISCONTINUED | OUTPATIENT
Start: 2023-01-05 | End: 2023-01-08 | Stop reason: HOSPADM

## 2023-01-05 RX ORDER — FAMOTIDINE 20 MG/1
20 TABLET, FILM COATED ORAL 2 TIMES DAILY PRN
Status: DISCONTINUED | OUTPATIENT
Start: 2023-01-05 | End: 2023-01-08 | Stop reason: HOSPADM

## 2023-01-05 RX ORDER — CLONIDINE HYDROCHLORIDE 0.1 MG/1
0.1 TABLET ORAL 4 TIMES DAILY PRN
Status: ACTIVE | OUTPATIENT
Start: 2023-01-06 | End: 2023-01-07

## 2023-01-05 RX ADMIN — TRAZODONE HYDROCHLORIDE 50 MG: 50 TABLET ORAL at 22:56

## 2023-01-05 RX ADMIN — CLONIDINE HYDROCHLORIDE 0.1 MG: 0.1 TABLET ORAL at 16:12

## 2023-01-05 RX ADMIN — HYDROXYZINE HYDROCHLORIDE 50 MG: 25 TABLET ORAL at 22:56

## 2023-01-05 RX ADMIN — CYCLOBENZAPRINE 10 MG: 10 TABLET, FILM COATED ORAL at 22:56

## 2023-01-05 NOTE — NURSING NOTE
Pt here to detox, went to [facility] day before yesterday.    Heroin-Last use day before yesterday, unsure of amount, snorting, has been using for about 5 years.      Benzos-day before yesterday, 1 pill, snorting, 5 years.      Pt denies HI, SI, or AVH.      COWS-20  CIWA-11    Pt is extremely anxious, gooseflesh, frequent shifting, nose is running. Pt requesting mat to lie down in floor.     Craving-10/10    Anxiety-5/10  Depression-5/10

## 2023-01-05 NOTE — ED NOTES
MEDICAL SCREENING:    Reason for Visit: detox     Patient initially seen in triage.  The patient was advised further evaluation and diagnostic testing will be needed, some of the treatment and testing will be initiated in the lobby in order to begin the process.  The patient will be returned to the waiting area for the time being and possibly be re-assessed by a subsequent ED provider.  The patient will be brought back to the treatment area in as timely manner as possible.       Nelda Chavez PA  01/05/23 1527

## 2023-01-05 NOTE — NURSING NOTE
Questioned pt about UDS positive for meth, she states she is using it daily, unsure of the amount, states she is using by snorting, she says her last use was the day before yesterday.    Denies any alcohol abuse or other substance use except Heroin and benzodiazepines.

## 2023-01-05 NOTE — NURSING NOTE
Spoke to doctor Silveira intake information labs and V/S provided and discussed with provider, instructed to admit with routine SP4 orders, Ativan Detox protocol and Clonidine detox protocol [id number]. Patient and ER provider made aware of admitting orders and plan of care.

## 2023-01-05 NOTE — NURSING NOTE
Patient to intake. Safety checks initiated.  pockets emptied and search completed with two staff members present. Items logged and placed in cabinet in intake area. Pt placed in safe room for evaluation. Will continue to monitor pt status. Waiting for ED provider to clear pt medically. Patient educated on the need to wear mask at all times while in the intake area for their safety and the safety of others.

## 2023-01-06 PROBLEM — F13.20 SEVERE BENZODIAZEPINE USE DISORDER (HCC): Status: ACTIVE | Noted: 2023-01-06

## 2023-01-06 PROBLEM — F15.20 METHAMPHETAMINE USE DISORDER, SEVERE: Status: ACTIVE | Noted: 2023-01-06

## 2023-01-06 PROBLEM — F11.20 OPIOID USE DISORDER, SEVERE, DEPENDENCE: Status: ACTIVE | Noted: 2023-01-05

## 2023-01-06 LAB — BACTERIA SPEC AEROBE CULT: NO GROWTH

## 2023-01-06 PROCEDURE — 93010 ELECTROCARDIOGRAM REPORT: CPT | Performed by: INTERNAL MEDICINE

## 2023-01-06 PROCEDURE — 99223 1ST HOSP IP/OBS HIGH 75: CPT | Performed by: PSYCHIATRY & NEUROLOGY

## 2023-01-06 RX ADMIN — LORAZEPAM 2 MG: 1 TABLET ORAL at 21:29

## 2023-01-06 RX ADMIN — LORAZEPAM 2 MG: 1 TABLET ORAL at 08:54

## 2023-01-06 RX ADMIN — LORAZEPAM 2 MG: 1 TABLET ORAL at 15:49

## 2023-01-06 RX ADMIN — HYDROXYZINE HYDROCHLORIDE 50 MG: 25 TABLET ORAL at 21:29

## 2023-01-06 RX ADMIN — TRAZODONE HYDROCHLORIDE 50 MG: 50 TABLET ORAL at 21:29

## 2023-01-06 RX ADMIN — CYCLOBENZAPRINE 10 MG: 10 TABLET, FILM COATED ORAL at 21:29

## 2023-01-06 NOTE — PLAN OF CARE
Goal Outcome Evaluation:  Plan of Care Reviewed With: patient  Patient Agreement with Plan of Care: agrees     Progress: no change  Outcome Evaluation: Pt is new pt -admitted for detox. Pt calm and cooperative at this time.

## 2023-01-06 NOTE — PLAN OF CARE
Problem: Adult Behavioral Health Plan of Care  Goal: Plan of Care Review  Outcome: Ongoing, Progressing  Flowsheets  Taken 1/6/2023 1200 by Barbie Flores LCSW  Consent Given to Review Plan with: [facility]  Taken 1/6/2023 0733 by Laurie Sandoval RN  Progress: improving  Plan of Care Reviewed With: patient  Patient Agreement with Plan of Care: agrees  Taken 1/5/2023 2021 by Liberty Ellis, RN  Outcome Evaluation: Pt is new pt -admitted for detox. Pt calm and cooperative at this time.     Problem: Adult Behavioral Health Plan of Care  Goal: Patient-Specific Goal (Individualization)  Outcome: Ongoing, Progressing  Flowsheets  Taken 1/6/2023 1200 by Barbie Flores LCSW  Patient-Specific Goals (Include Timeframe): Patient planning to go to [facility] following medical detox.  Individualized Care Needs: Patient expressing deisre to detox from heroin, meth and Xanax daily use.  Taken 1/6/2023 1135 by Barbie Flores LCSW  Patient Personal Strengths: (at treatment voluntarily)   ability to maintain sobriety   expressive of needs   motivated for treatment   resourceful   self-reliant   spiritual/Latter-day support   other (see comments)  Patient Vulnerabilities:   adverse childhood experience(s)   limited support system   occupational insecurity   poor impulse control   substance abuse/addiction   traumatic event   family/relationship conflict  Taken 1/5/2023 2000 by Liberty Ellis, RN  Anxieties, Fears or Concerns: wants to detox from drugs     Problem: Adult Behavioral Health Plan of Care  Goal: Develops/Participates in Therapeutic [place] to Support Successful Transition  Outcome: Ongoing, Progressing  Flowsheets (Taken 1/6/2023 1200)  Develops/Participates in Therapeutic [place] to Support Successful Transition: making progress toward outcome  Intervention: Foster Therapeutic [place]  Flowsheets (Taken 1/6/2023 1200)  Trust Relationship/Rapport:   care explained   emotional support provided   empathic listening  provided   questions encouraged   reassurance provided   thoughts/feelings acknowledged  Intervention: Mutually Develop Transition Plan  Flowsheets  Taken 1/6/2023 1200  Outpatient/Agency/Support Group Needs: residential services  Transition Support: follow-up care coordinated  Anticipated Discharge Disposition: residential substance use unit  Taken 1/6/2023 1151  Discharge Coordination/Progress: Patient intends to go to medical detox at [facility] following inpatient hospitalization.  HARPER signed.  Contacted [facility], spoke with Tasha.  She can return after medical detox.  Concerns Comments: Patient to complete residential treatment at [facility].  Transportation Anticipated: health plan transportation  Transportation Concerns: no car  Current Discharge Risk: substance use/abuse  Concerns to be Addressed: substance/tobacco abuse/use  Readmission Within the Last 30 Days: no previous admission in last 30 days  Patient/Family Anticipated Services at Transition: rehabilitation services  Patient's Choice of Community Agency(s): [facility] in [place]  Patient/Family Anticipates Transition to: inpatient rehabilitation facility  Offered/Gave Vendor List: no     Problem: Adult Behavioral Health Plan of Care  Goal: Develops/Participates in Therapeutic [place] to Support Successful Transition  Intervention: Foster Therapeutic [place]  Flowsheets (Taken 1/6/2023 1200)  Trust Relationship/Rapport:   care explained   emotional support provided   empathic listening provided   questions encouraged   reassurance provided   thoughts/feelings acknowledged     Problem: Adult Behavioral Health Plan of Care  Goal: Develops/Participates in Therapeutic [place] to Support Successful Transition  Intervention: Mutually Develop Transition Plan  Flowsheets  Taken 1/6/2023 1200  Outpatient/Agency/Support Group Needs: residential services  Transition Support: follow-up care coordinated  Anticipated Discharge Disposition: residential  substance use unit  Taken 1/6/2023 1151  Discharge Coordination/Progress: Patient intends to go to medical detox at \[facility]\"" following inpatient hospitalization.  HARPER signed.  Contacted \[facility]\"", spoke with Tasha.  She can return after medical detox.  Concerns Comments: Patient to complete residential treatment at \[facility]\"".  Transportation Anticipated: health plan transportation  Transportation Concerns: no car  Current Discharge Risk: substance use/abuse  Concerns to be Addressed: substance/tobacco abuse/use  Readmission Within the Last 30 Days: no previous admission in last 30 days  Patient/Family Anticipated Services at Transition: rehabilitation services  Patient's Choice of Community Agency(s): \[facility]\"" in [place]  Patient/Family Anticipates Transition to: inpatient rehabilitation facility  Offered/Gave Vendor List: no     Problem: Social, Occupational or Functional Impairment (Excessive Substance Use)  Goal: Enhanced Social, Occupational or Functional Skills (Excessive Substance Use)  Intervention: Promote Social, Occupational and Functional Ability  Recent Flowsheet Documentation  Taken 1/6/2023 1200 by Barbie Flores LCSW  Trust Relationship/Rapport:   care explained   emotional support provided   empathic listening provided   questions encouraged   reassurance provided   thoughts/feelings acknowledged   Goal Outcome Evaluation:     Data:  Therapist reviewed Dr. Silveira's assessment, discussed patient with nursing staff and met with patient this date to further discuss patient progress, review healthy coping and safe disposition.  Met with pt at bedside to complete initial therapy intake.  Patient was anxious and restless as evidenced by being in constant motion in the bed.  She reports she came to detox from \[facility]\"" residential treatment, plans to return there after medial detox. She has been using heroin, meth and Xanax daily.  Patient is estranged from family and homeless due to drug use.   Denies major childhood stressors then disclosed that her mother committed suicide when pt was 11 yo, pt started abusing substances at age 14.  Reports her primary support system as being at [facility].  Also reports she maintained 2 years of sobriety through residential treatment at [facility] previously.  Reported she relapsed 6 months ago.  Patient was reticent do share more than brief answers to therapist's questions due to not feeling well.     Clinical Maneuvering/Intervention:    Therapist assisted patient in processing above session content; acknowledged and normalized patient's thoughts, feelings and concerns.  Encouraged patient to discuss/vent feelings, frustrations, and fears concerning their ongoing issues and validated patients feelings.  Discussed the importance of healthy coping and reviewed healthy coping skills such as distraction, thought reframing/redirecting, grounding, mindfulness, etc.  Reviewed safe disposition with patient.    Assessment:      Patient is a 31 yo white female admitted to inpatient detox for substance withdrawal.  At time of assessment pt is alert and oriented to person, place, time, or situation.  Pt denied SI/HI, denied AH/VH.  Patient was very restless and anxious during visit, provided minimal responses to questions, kept moving and relocating her legs.  Affect is very restricted, pt has normal speech and eye contact.  Has little family support, views [facility] as primary support system.  Patient's insight, judgment, impulse control and reliability are limited.  At this time patient appears to be not feeling well but continuing with motivation to return to [facility].     Plan:  Patient will continue hospitalization/medication management. Patient will return to [facility] upon stabilization. Contact made with [facility] to confirm this as the discharge plan.  Spoke with Tasha.     This document signed by Barbie Flores LCSW, January 6, 2023, 12:03 EST         Consent Given to  Review Plan with: [place]

## 2023-01-06 NOTE — ED PROVIDER NOTES
Subjective   History of Present Illness  32-year-old female who presents to the ED today for detox evaluation.  She reports that she needs detox from heroin.  She reports that she last used 2 days ago.  She states she also uses 1 Xanax pill daily.  She reports her last use was 2 days ago as well.  She states currently she is having body aches with vomiting and diarrhea.  She denies any suicidal ideations.    History provided by:  Patient  Drug / Alcohol Assessment  Primary symptoms comment: requesting detox. This is a new problem. The current episode started yesterday. The problem has been gradually worsening. Suspected agents include heroin and prescription drugs. Associated symptoms include nausea and vomiting. Associated medical issues include addiction treatment.       Review of Systems   Constitutional: Negative.    HENT: Negative.    Eyes: Negative.    Respiratory: Negative.    Cardiovascular: Negative.    Gastrointestinal: Positive for diarrhea, nausea and vomiting.   Genitourinary: Negative.    Musculoskeletal: Positive for myalgias.   Skin: Negative.    Neurological: Negative.    Psychiatric/Behavioral: Negative.    All other systems reviewed and are negative.      Past Medical History:   Diagnosis Date   • Substance abuse (HCC)    • Withdrawal symptoms, drug or narcotic (HCC)        Allergies   Allergen Reactions   • Penicillins Hives       Past Surgical History:   Procedure Laterality Date   • VAGINAL DELIVERY         History reviewed. No pertinent family history.    Social History     Socioeconomic History   • Marital status: Single   Tobacco Use   • Smoking status: Former   • Smokeless tobacco: Never   Vaping Use   • Vaping Use: Some days   • Substances: Nicotine, Flavoring   • Devices: Disposable   Substance and Sexual Activity   • Alcohol use: No   • Drug use: Yes     Types: Heroin, Benzodiazepines     Comment: percocets, on subutex now    • Sexual activity: Yes     Partners: Male     Birth  control/protection: None           Objective   Physical Exam  Vitals and nursing note reviewed.   Constitutional:       General: She is not in acute distress.     Appearance: Normal appearance.   HENT:      Head: Normocephalic and atraumatic.      Right Ear: External ear normal.      Left Ear: External ear normal.   Eyes:      Conjunctiva/sclera: Conjunctivae normal.      Pupils: Pupils are equal, round, and reactive to light.   Cardiovascular:      Rate and Rhythm: Normal rate and regular rhythm.      Pulses: Normal pulses.      Heart sounds: Normal heart sounds.   Pulmonary:      Effort: Pulmonary effort is normal.      Breath sounds: Normal breath sounds.   Abdominal:      General: Bowel sounds are normal.      Palpations: Abdomen is soft.   Musculoskeletal:         General: Normal range of motion.      Cervical back: Normal range of motion and neck supple.   Skin:     General: Skin is warm and dry.      Capillary Refill: Capillary refill takes less than 2 seconds.   Neurological:      General: No focal deficit present.      Mental Status: She is alert and oriented to person, place, and time.   Psychiatric:         Mood and Affect: Mood is anxious.         Speech: Speech normal.         Behavior: Behavior normal. Behavior is cooperative.         Thought Content: Thought content does not include homicidal or suicidal ideation.         Procedures        Results for orders placed or performed during the hospital encounter of 01/05/23   COVID-19 and FLU A/B PCR - Swab, Nasopharynx    Specimen: Nasopharynx; Swab   Result Value Ref Range    COVID19 Not Detected Not Detected - Ref. Range    Influenza A PCR Not Detected Not Detected    Influenza B PCR Not Detected Not Detected   Comprehensive Metabolic Panel    Specimen: Blood   Result Value Ref Range    Glucose 110 (H) 65 - 99 mg/dL    BUN 11 6 - 20 mg/dL    Creatinine 0.69 0.57 - 1.00 mg/dL    Sodium 139 136 - 145 mmol/L    Potassium 4.2 3.5 - 5.2 mmol/L    Chloride  106 98 - 107 mmol/L    CO2 23.3 22.0 - 29.0 mmol/L    Calcium 9.6 8.6 - 10.5 mg/dL    Total Protein 7.3 6.0 - 8.5 g/dL    Albumin 4.1 3.5 - 5.2 g/dL    ALT (SGPT) 54 (H) 1 - 33 U/L    AST (SGOT) 44 (H) 1 - 32 U/L    Alkaline Phosphatase 83 39 - 117 U/L    Total Bilirubin 0.3 0.0 - 1.2 mg/dL    Globulin 3.2 gm/dL    A/G Ratio 1.3 g/dL    BUN/Creatinine Ratio 15.9 7.0 - 25.0    Anion Gap 9.7 5.0 - 15.0 mmol/L    eGFR 118.4 >60.0 mL/min/1.73   Urinalysis With Microscopic If Indicated (No Culture) - Urine, Clean Catch    Specimen: Urine, Clean Catch   Result Value Ref Range    Color, UA Yellow Yellow, Straw    Appearance, UA Cloudy (A) Clear    pH, UA 6.5 5.0 - 8.0    Specific Gravity, UA 1.022 1.005 - 1.030    Glucose, UA Negative Negative    Ketones, UA Negative Negative    Bilirubin, UA Negative Negative    Blood, UA Negative Negative    Protein, UA Negative Negative    Leuk Esterase, UA Small (1+) (A) Negative    Nitrite, UA Negative Negative    Urobilinogen, UA 1.0 E.U./dL 0.2 - 1.0 E.U./dL   Pregnancy, Urine - Urine, Clean Catch    Specimen: Urine, Clean Catch   Result Value Ref Range    HCG, Urine QL Negative Negative   Urine Drug Screen - Urine, Clean Catch    Specimen: Urine, Clean Catch   Result Value Ref Range    THC, Screen, Urine Negative Negative    Phencyclidine (PCP), Urine Negative Negative    Cocaine Screen, Urine Negative Negative    Methamphetamine, Ur Positive (A) Negative    Opiate Screen Negative Negative    Amphetamine Screen, Urine Positive (A) Negative    Benzodiazepine Screen, Urine Positive (A) Negative    Tricyclic Antidepressants Screen Negative Negative    Methadone Screen, Urine Negative Negative    Barbiturates Screen, Urine Negative Negative    Oxycodone Screen, Urine Negative Negative    Propoxyphene Screen Negative Negative    Buprenorphine, Screen, Urine Negative Negative   Ethanol    Specimen: Blood   Result Value Ref Range    Ethanol <10 0 - 10 mg/dL    Ethanol % <0.010 %   CBC  Auto Differential    Specimen: Blood   Result Value Ref Range    WBC 8.75 3.40 - 10.80 10*3/mm3    RBC 4.58 3.77 - 5.28 10*6/mm3    Hemoglobin 13.7 12.0 - 15.9 g/dL    Hematocrit 41.1 34.0 - 46.6 %    MCV 89.7 79.0 - 97.0 fL    MCH 29.9 26.6 - 33.0 pg    MCHC 33.3 31.5 - 35.7 g/dL    RDW 13.5 12.3 - 15.4 %    RDW-SD 44.4 37.0 - 54.0 fl    MPV 9.9 6.0 - 12.0 fL    Platelets 333 140 - 450 10*3/mm3    Neutrophil % 85.9 (H) 42.7 - 76.0 %    Lymphocyte % 11.7 (L) 19.6 - 45.3 %    Monocyte % 1.5 (L) 5.0 - 12.0 %    Eosinophil % 0.1 (L) 0.3 - 6.2 %    Basophil % 0.6 0.0 - 1.5 %    Immature Grans % 0.2 0.0 - 0.5 %    Neutrophils, Absolute 7.52 (H) 1.70 - 7.00 10*3/mm3    Lymphocytes, Absolute 1.02 0.70 - 3.10 10*3/mm3    Monocytes, Absolute 0.13 0.10 - 0.90 10*3/mm3    Eosinophils, Absolute 0.01 0.00 - 0.40 10*3/mm3    Basophils, Absolute 0.05 0.00 - 0.20 10*3/mm3    Immature Grans, Absolute 0.02 0.00 - 0.05 10*3/mm3    nRBC 0.0 0.0 - 0.2 /100 WBC   Urinalysis, Microscopic Only - Urine, Clean Catch    Specimen: Urine, Clean Catch   Result Value Ref Range    RBC, UA 3-5 (A) None Seen, 0-2 /HPF    WBC, UA 31-50 (A) None Seen, 0-2 /HPF    Bacteria, UA 3+ (A) None Seen /HPF    Squamous Epithelial Cells, UA 21-30 (A) None Seen, 0-2 /HPF    Hyaline Casts, UA None Seen None Seen /LPF    Methodology Automated Microscopy          ED Course  ED Course as of 01/05/23 2008   Thu Jan 05, 2023   1805 Medically clear for psych [AH]      ED Course User Index  [AH] Jana Richey, PA                                           Medical Decision Making  32-year-old female who presents to the ED today for detox evaluation.  She reports needing detox from heroin and Xanax.  She is having active withdrawal symptoms.  She denies any suicidal ideations.  She is medically clear for admission to the chemical dependency unit and psychiatry agrees with admission.    Substance abuse (HCC): complicated acute illness or injury  Amount and/or Complexity of  Data Reviewed  Labs: ordered.      Risk  Prescription drug management.  Decision regarding hospitalization.          Final diagnoses:   Substance abuse (HCC)       ED Disposition  ED Disposition     ED Disposition   DC/Transfer to Behavioral Health    Condition   Stable    Comment   --             No follow-up provider specified.       Medication List      No changes were made to your prescriptions during this visit.          Jana Richey PA  01/05/23 2008

## 2023-01-06 NOTE — H&P
INITIAL PSYCHIATRIC HISTORY & PHYSICAL    Patient Identification:  Name:  Anna Calix  Age:  32 y.o.  Sex:  female  :  1990  MRN:  [id number]   Visit Number:  [id number]  Primary Care Physician:  Provider, No Known    SUBJECTIVE    CC/Focus of Exam: heroin, meth and benzo use    HPI: Anna Calix is a 32 y.o. female who was admitted on 2023 with complaints of heroin, benzo and methamphetamine use and withdrawals. The patient reports a long history of substance use. First use was in her teens. Over time the use increased and the patient  continued to use despite negative consequences. The patient endorses symptoms of tolerance and withdrawals. Has tried to cut down and stop but has not been successful. Spends too much time and resources in pursuit of substance use. Longest period of sobriety is reported to be 2 years.  Currently using heroin 1 gm daily intransally, and Xanax 4-5 mg daily intranasally, methamphetamine half a gram intranasally daily.  Last use was day before yesterday  Withdrawal symptoms include body aches, restlessness, anxiety, tremors, stomach discomfort    PAST PSYCHIATRIC HX: None reported.    SUBSTANCE USE HX: See HPI.     SOCIAL HX:   Social History     Socioeconomic History   • Marital status: Single   Tobacco Use   • Smoking status: Former   • Smokeless tobacco: Never   Vaping Use   • Vaping Use: Some days   • Substances: Nicotine, Flavoring   • Devices: Disposable   Substance and Sexual Activity   • Alcohol use: No   • Drug use: Yes     Types: Heroin, Benzodiazepines     Comment: percocets, on subutex now    • Sexual activity: Yes     Partners: Male     Birth control/protection: None         Past Medical History:   Diagnosis Date   • Substance abuse (HCC)    • Withdrawal symptoms, drug or narcotic (HCC)           Past Surgical History:   Procedure Laterality Date   • VAGINAL DELIVERY         FAMILY HISTORY: Both parents were alcoholics. Mother committed suicide.     No  medications prior to admission.         ALLERGIES:  Penicillins    Temp:  [97.7 °F (36.5 °C)-98.7 °F (37.1 °C)] 98 °F (36.7 °C)  Heart Rate:  [] 117  Resp:  [16-19] 18  BP: (104-129)/(61-82) 129/82    REVIEW OF SYSTEMS:  Review of Systems   Constitutional: Positive for chills, diaphoresis and fatigue.   HENT: Positive for congestion.    Eyes: Negative.    Respiratory: Negative.    Cardiovascular: Negative.    Gastrointestinal: Negative.    Endocrine: Negative.    Genitourinary: Negative.    Musculoskeletal: Positive for arthralgias and myalgias.   Skin: Negative.    Allergic/Immunologic: Negative.    Neurological: Positive for tremors and weakness.   Hematological: Negative.    Psychiatric/Behavioral: Positive for dysphoric mood. The patient is nervous/anxious.         OBJECTIVE    PHYSICAL EXAM:  Physical Exam  Constitutional:  Appears well-developed and well-nourished.   HENT:   Head: Normocephalic and atraumatic.   Right Ear: External ear normal.   Left Ear: External ear normal.   Mouth/Throat: Oropharynx is clear and moist.   Eyes: Pupils are equal, round, and reactive to light. Conjunctivae and EOM are normal.   Neck: Normal range of motion. Neck supple.   Cardiovascular: Normal rate, regular rhythm and normal heart sounds.    Respiratory: Effort normal and breath sounds normal. No respiratory distress. No wheezes.   GI: Soft. Bowel sounds are normal.No distension. There is no tenderness.   Musculoskeletal: Normal range of motion. No edema or deformity.   Neurological:No cranial nerve deficit. Coordination normal.   Skin: Skin is warm and dry. No rash noted. No erythema.       MENTAL STATUS EXAM:   Hygiene:   fair  Cooperation:  Cooperative  Eye Contact:  Fair  Psychomotor Behavior:  Appropriate  Affect:  Appropriate  Hopelessness: Denies  Speech:  Normal  Goal directed  Thought Content:  Normal  Suicidal:  None  Homicidal:  None  Hallucinations:  None  Delusion:  None  Memory:  Intact  Orientation:   Person, Place, Time and Situation  Reliability:  fair  Insight:  Fair  Judgement:  Fair  Impulse Control:  Fair      Imaging Results (Last 24 Hours)     ** No results found for the last 24 hours. **           ECG/EMG Results (most recent)     Procedure Component Value Units Date/Time    ECG 12 Lead Other [[id number]] Collected: 01/06/23 0130     Updated: 01/06/23 0135     QT Interval 390 ms      QTC Interval 432 ms     Narrative:      Test Reason : Other~  Blood Pressure :   */*   mmHG  Vent. Rate :  74 BPM     Atrial Rate :  74 BPM     P-R Int : 120 ms          QRS Dur :  82 ms      QT Int : 390 ms       P-R-T Axes :  74  72  64 degrees     QTc Int : 432 ms    Normal sinus rhythm with sinus arrhythmia  Voltage criteria for left ventricular hypertrophy  Abnormal ECG  No previous ECGs available    Referred By:            Confirmed By:            Lab Results   Component Value Date    GLUCOSE 110 (H) 01/05/2023    BUN 11 01/05/2023    CREATININE 0.69 01/05/2023    EGFRIFNONA >60 09/02/2021    EGFRIFAFRI >60 09/02/2021    BCR 15.9 01/05/2023    CO2 23.3 01/05/2023    CALCIUM 9.6 01/05/2023    ALBUMIN 4.1 01/05/2023    AST 44 (H) 01/05/2023    ALT 54 (H) 01/05/2023       Lab Results   Component Value Date    WBC 8.75 01/05/2023    HGB 13.7 01/05/2023    HCT 41.1 01/05/2023    MCV 89.7 01/05/2023     01/05/2023       Last Urine Toxicity     LAST URINE TOXICITY RESULTS Latest Ref Rng & Units 1/5/2023 9/2/2021    AMPHETAMINES SCREEN, URINE Negative Positive(A) -    BARBITURATES SCREEN Negative Negative -    BENZODIAZEPINE SCREEN, URINE Negative Positive(A) -    BUPRENORPHINEUR Negative Negative <50    COCAINE SCREEN, URINE Negative Negative -    HYDROMORPHONE <50 ng/mL - -    METHADONE SCREEN, URINE Negative Negative -    METHAMPHETAMINEUR Negative Positive(A) -          Brief Urine Lab Results  (Last result in the past 365 days)      Color   Clarity   Blood   Leuk Est   Nitrite   Protein   CREAT   Urine HCG         01/05/23 1545 Yellow   Cloudy   Negative   Small (1+)   Negative   Negative           01/05/23 1545               Negative             DATA  Labs reviewed. Glucose 110, ALT 54, AST 44. UA shows cloudy appearance, small leukocyte esterase, 3-5 RBC, 31-50 WBC, 3+ bacteria, 21-30 sq epi cell  EKG reviewed. QTc 432 ms FREITAS reviewed.   Record reviewed. No previous treatment noted in this hospital for mental health or substance use problems.       Strengths: Motivated for treatment    Weaknesses:Substance use and Poor coping skills    Code status:  Full  Discussed code status with patient.    ASSESSMENT & PLAN:        Opioid use disorder, severe, dependence (HCC)  -Clonidine detox  -Symptom control medications      Severe benzodiazepine use disorder (HCC)]  -Ativan detox      Methamphetamine use disorder, severe (HCC)  -Supportive treatment      UTI?  -Patient reports no dysuria. Will not start on abx at this time      Nicotine use disorder  -Nicotine replacement      The patient has been admitted for safety and stabilization.  Patient will be monitored for suicidality daily and maintained on Special Precautions Level 4 (q30 min checks).  The patient will have individual and group therapy with a master's level therapist. A master treatment plan will be developed and agreed upon by the patient and his/her treatment team.  The patient's estimated length of stay in the hospital is 5-7 days.

## 2023-01-06 NOTE — PROGRESS NOTES
Navigator is helping Primary Therapist with discharge planning for patient. Navigator contacted [facility]. They will accept patient back at discharge.     [facility] - [phone]

## 2023-01-06 NOTE — PLAN OF CARE
Goal Outcome Evaluation:  Plan of Care Reviewed With: patient  Patient Agreement with Plan of Care: agrees     Progress: improving  Outcome Evaluation: pt verbalzies not sleeping well rates anxiety 8  feeling on edge rates depression 4 denies s/i denies h/i she verbalzies craving herion 8 co's feeling on edge & not sleeping quiet manner through out day cooperative manner .

## 2023-01-07 PROCEDURE — 99232 SBSQ HOSP IP/OBS MODERATE 35: CPT | Performed by: PSYCHIATRY & NEUROLOGY

## 2023-01-07 RX ADMIN — LORAZEPAM 1.5 MG: 0.5 TABLET ORAL at 21:43

## 2023-01-07 RX ADMIN — LORAZEPAM 1.5 MG: 0.5 TABLET ORAL at 08:16

## 2023-01-07 RX ADMIN — TRAZODONE HYDROCHLORIDE 50 MG: 50 TABLET ORAL at 21:43

## 2023-01-07 RX ADMIN — IBUPROFEN 400 MG: 400 TABLET, FILM COATED ORAL at 08:16

## 2023-01-07 RX ADMIN — CYCLOBENZAPRINE 10 MG: 10 TABLET, FILM COATED ORAL at 21:43

## 2023-01-07 RX ADMIN — LORAZEPAM 1.5 MG: 0.5 TABLET ORAL at 14:24

## 2023-01-07 NOTE — PLAN OF CARE
"Goal Outcome Evaluation:  Plan of Care Reviewed With: patient  Patient Agreement with Plan of Care: agrees     Progress: improving  Outcome Evaluation: Pt pleasant and cooperative with staff. Pt c/o \"hot and cold, tremors, not sleeping\" as her primary w/d sx. Pt rates anxiety/craving 8/10 and depression 4/10. Pt denies SI/HI/AVH. Pt slept off/on throughout the night.  "

## 2023-01-07 NOTE — PLAN OF CARE
Goal Outcome Evaluation:  Plan of Care Reviewed With: patient  Patient Agreement with Plan of Care: agrees     Progress: improving  Outcome Evaluation: Patient calm and cooperative. Rates anxiety 7/10. Depression 3/10. Cravings 6/10. Withdrawal symptoms: body aches, sweats, leg cramps, and tremors. Denies FRANCA JERZE Hall. No other issues noted at this time. Will conintue to monitor.

## 2023-01-08 VITALS
OXYGEN SATURATION: 98 % | TEMPERATURE: 97.8 F | DIASTOLIC BLOOD PRESSURE: 80 MMHG | RESPIRATION RATE: 18 BRPM | HEIGHT: 58 IN | SYSTOLIC BLOOD PRESSURE: 126 MMHG | WEIGHT: 120 LBS | BODY MASS INDEX: 25.19 KG/M2 | HEART RATE: 75 BPM

## 2023-01-08 LAB
QT INTERVAL: 390 MS
QTC INTERVAL: 432 MS

## 2023-01-08 PROCEDURE — 99238 HOSP IP/OBS DSCHRG MGMT 30/<: CPT | Performed by: PSYCHIATRY & NEUROLOGY

## 2023-01-08 RX ADMIN — LORAZEPAM 1 MG: 1 TABLET ORAL at 08:36

## 2023-01-08 NOTE — PLAN OF CARE
"Goal Outcome Evaluation:  Plan of Care Reviewed With: patient  Patient Agreement with Plan of Care: agrees     Progress: no change  Outcome Evaluation: Pt rates anxiety 3/10, depression 2/10 and craving 5/10. Pt denies SI/HI/AVH. Pt continues to isolate to her room but states she is feeling \"better.\"   Pt appeared to sleep throughout the night.  "

## 2023-01-08 NOTE — DISCHARGE SUMMARY
Date of Discharge: 1/8/2023    Discharge Diagnosis:Principal Problem:    Opioid use disorder, severe, dependence (HCC)  Active Problems:    Severe benzodiazepine use disorder (HCC)    Methamphetamine use disorder, severe (HCC)      Hospital Course:  Patient was admitted for detox and stabilization.   Routine labs were checked.  Patient was assigned a master's level therapist and provided with an opportunity to participate in group and individual chemical dependency counseling on the unit.  Patient was started on an Ativan detox protocol, and clonidine detox protocol.  She reported moderate withdrawal symptoms on hospital day 1 in hospital day 2.  She denied withdrawal symptoms on morning of hospital day 3 and requested to leave AGAINST MEDICAL ADVICE.  She not meet criteria for hold against her will.  She left AGAINST MEDICAL ADVICE.    Consults:   Consults     No orders found from 12/7/2022 to 1/6/2023.          Labs:  Lab Results (all)     None          Imaging:  Imaging Results (All)     None                  Condition on Discharge:  improved      Vital Signs  Temp:  [97.8 °F (36.6 °C)-98.9 °F (37.2 °C)] 97.8 °F (36.6 °C)  Heart Rate:  [] 75  Resp:  [16-18] 18  BP: (107-131)/(52-80) 126/80    Discharge Disposition  Left Against Medical Advice    Discharge Medications     Discharge Medications      Patient Not Prescribed Medications Upon Discharge         Discharge Diet: Regular    Activity at Discharge: As tolerated        Time: I spent  15 minutes on this discharge activity which included: face-to-face encounter with the patient, reviewing the data in the system, coordination of the care with the nursing staff as well as consultants, documentation, and entering orders.      Vickey Silveira MD  01/08/23  11:47 EST

## 2023-01-08 NOTE — NURSING NOTE
Patient educated on the risks/benefits of leaving against medical advice. Explained to patient that leaving against medical advice could possibly place her life or health at risk. Patient still adamant about leaving. Dr. Silveira aware and put in AMA discharge order.

## 2023-01-08 NOTE — PROGRESS NOTES
"    Detox Recovery Unit Progress Note   Clinician: Vickey Silveira MD  Admission Date: 1/5/2023  11:44 EST 01/08/23    Behavioral Health Treatment Plan and Problem List: I have reviewed and approved the Behavioral Health Treatment Plan and Problem list.    Allergies  Allergies   Allergen Reactions   • Penicillins Hives       Hospital Day: 3 days      Assessment completed within view of staff    History  CC: withdrawal symptoms    Interval HPI: Patient seen and evaluated by me.  Chart reviewed. Patient is withdrawing from heroin, meth and benzos  Patient denies withdrawal symptoms today.  She says she wants to go because her son is in the rehab facility.    ROS otherwise as below.        Interval Review of Systems:   General ROS: negative for - fever.  Positive for withdrawal symptoms mentioned above.  Endocrine ROS: negative for - palpitations  Respiratory ROS: no cough, shortness of breath, or wheezing  Cardiovascular ROS: no chest pain or dyspnea on exertion  Gastrointestinal ROS: no abdominal pain,no black or bloody stools    /80 (BP Location: Right arm, Patient Position: Sitting)   Pulse 75   Temp 97.8 °F (36.6 °C) (Temporal)   Resp 18   Ht 147.3 cm (58\")   Wt 54.4 kg (120 lb)   LMP 12/22/2022   SpO2 98%   Breastfeeding No   BMI 25.08 kg/m²     Mental Status Exam  Mood: normal  Affect: mood congruent  Thought Processes: linear  Oriented X3:  yes  Thought Content: sensorium intact   Suicidal Thoughts: denies  Homicidal Thoughts: denies        Medical Decision Making:   Labs:     Lab Results (last 24 hours)     ** No results found for the last 24 hours. **            Radiology:     Imaging Results (Last 24 Hours)     ** No results found for the last 24 hours. **            EKG:     ECG/EMG Results (most recent)     Procedure Component Value Units Date/Time    ECG 12 Lead Other [[id number]] Collected: 01/06/23 0130     Updated: 01/06/23 0135     QT Interval 390 ms      QTC Interval 432 ms     " Narrative:      Test Reason : Other~  Blood Pressure :   */*   mmHG  Vent. Rate :  74 BPM     Atrial Rate :  74 BPM     P-R Int : 120 ms          QRS Dur :  82 ms      QT Int : 390 ms       P-R-T Axes :  74  72  64 degrees     QTc Int : 432 ms    Normal sinus rhythm with sinus arrhythmia  Voltage criteria for left ventricular hypertrophy  Abnormal ECG  No previous ECGs available    Referred By:            Confirmed By:            Medications:  LORazepam, 1 mg, Oral, 3 times per day   Followed by  [START ON 1/9/2023] LORazepam, 0.5 mg, Oral, 3 times per day  nicotine, 1 patch, Transdermal, Q24H           All medications reviewed.      Assessment and Plan:  Opioid use disorder, severe, dependence (HCC)  -Clonidine detox  -Symptom control medications       Severe benzodiazepine use disorder (HCC)]  -Ativan detox       Methamphetamine use disorder, severe (HCC)  -Supportive treatment      R/O UTI  -Patient reports no dysuria. Monitor       Nicotine use disorder  -Nicotine replacement             Patient is requesting to leave.  She does not meet criteria for hold against her will.  I have advised her that there is a risk for seizures and/or death in withdrawal without medical support.  She voices an understanding.  She will be leaving AGAINST MEDICAL ADVICE.

## 2023-01-09 NOTE — PAYOR COMM NOTE
"Anna Calix (32 y.o. Female)     Date of Birth   1990    Social Security Number       Address   [address] [address]    Home Phone   [phone]    MRN   [id number]       Latter day   None    Marital Status   Single                            Admission Date   23    Admission Type   Emergency    Admitting Provider   Vickey Silveira MD    Attending Provider       Department, Room/Bed   [facility] ADULT CD, 1046/2S       Discharge Date   2023    Discharge Disposition   Left Against Medical Advice    Discharge Destination                               Attending Provider: (none)   Allergies: Penicillins    Isolation: None   Infection: None   Code Status: Prior    Ht: 147.3 cm (58\")   Wt: 54.4 kg (120 lb)    Admission Cmt: None   Principal Problem: Opioid use disorder, severe, dependence (HCC) [F11.20]                 Active Insurance as of 2023     Primary Coverage     Payor Plan Insurance Group Employer/Plan Group    [employer] MEDICAID [employer] MEDICAID [id number]     Payor Plan Address Payor Plan Phone Number Payor Plan Fax Number Effective Dates    [address] [address] [phone]  3/1/2016 - None Entered    [facility] [id number]       Subscriber Name Subscriber Birth Date Member ID       ANNA CALIX 1990 [id number]                 Emergency Contacts      (Rel.) Home Phone Work Phone Mobile Phone    Garry Haque (Significant Other) [phone] -- --        RE: Anna Calix  : 2023  Please see above for additional demographics    Discharge date: 2023 (pt discharged against medical advice)    PLEASE ATTACH THIS DISCHARGE INFORMATION TO AUTHORIZATION [id number]    Follow up:    [place] [address]  [phone]     Return at discharge.      Discharge Summary:    Vickey Silveira MD   Physician  Psychiatry  Discharge Summary      Signed  Date of Service:  23 1147  Creation Time:  23 1147   "   Signed             Date of Discharge: 1/8/2023     Discharge Diagnosis:Principal Problem:    Opioid use disorder, severe, dependence (HCC)  Active Problems:    Severe benzodiazepine use disorder (HCC)    Methamphetamine use disorder, severe (HCC)        Hospital Course:  Patient was admitted for detox and stabilization.   Routine labs were checked.  Patient was assigned a master's level therapist and provided with an opportunity to participate in group and individual chemical dependency counseling on the unit.  Patient was started on an Ativan detox protocol, and clonidine detox protocol.  She reported moderate withdrawal symptoms on hospital day 1 in hospital day 2.  She denied withdrawal symptoms on morning of hospital day 3 and requested to leave AGAINST MEDICAL ADVICE.  She not meet criteria for hold against her will.  She left AGAINST MEDICAL ADVICE.     Consults:       Consults      No orders found from 12/7/2022 to 1/6/2023.             Labs:      Lab Results (all)      None             Imaging:      Imaging Results (All)      None                         Condition on Discharge:  improved        Vital Signs  Temp:  [97.8 °F (36.6 °C)-98.9 °F (37.2 °C)] 97.8 °F (36.6 °C)  Heart Rate:  [] 75  Resp:  [16-18] 18  BP: (107-131)/(52-80) 126/80     Discharge Disposition  Left Against Medical Advice     Discharge Medications      Discharge Medications       Patient Not Prescribed Medications Upon Discharge            Discharge Diet: Regular     Activity at Discharge: As tolerated           Time: I spent  15 minutes on this discharge activity which included: face-to-face encounter with the patient, reviewing the data in the system, coordination of the care with the nursing staff as well as consultants, documentation, and entering orders.       Vickey Silveira MD  01/08/23  11:47 EST

## 2024-02-27 LAB
EXTERNAL HEPATITIS B SURFACE ANTIGEN: NEGATIVE
EXTERNAL RUBELLA QUALITATIVE: NORMAL
EXTERNAL SYPHILIS RPR SCREEN: NORMAL
HIV1 P24 AG SERPL QL IA: NORMAL

## 2024-05-29 ENCOUNTER — TRANSCRIBE ORDERS (OUTPATIENT)
Dept: OBSTETRICS AND GYNECOLOGY | Facility: HOSPITAL | Age: 34
End: 2024-05-29
Payer: MEDICAID

## 2024-05-29 DIAGNOSIS — O36.5920 MATERNAL CARE FOR OTHER KNOWN OR SUSPECTED POOR FETAL GROWTH, SECOND TRIMESTER, NOT APPLICABLE OR UNSPECIFIED: Primary | ICD-10-CM

## 2024-05-29 DIAGNOSIS — Z34.90 PREGNANCY, UNSPECIFIED GESTATIONAL AGE: ICD-10-CM

## 2024-06-06 RX ORDER — PRENATAL VIT NO.126/IRON/FOLIC 28MG-0.8MG
1 TABLET ORAL DAILY
COMMUNITY

## 2024-06-10 ENCOUNTER — HOSPITAL ENCOUNTER (OUTPATIENT)
Dept: WOMENS IMAGING | Facility: HOSPITAL | Age: 34
Discharge: HOME | End: 2024-06-10
Admitting: NURSE PRACTITIONER
Payer: MEDICAID

## 2024-06-10 ENCOUNTER — OFFICE VISIT (OUTPATIENT)
Dept: OBSTETRICS AND GYNECOLOGY | Facility: HOSPITAL | Age: 34
End: 2024-06-10
Payer: MEDICAID

## 2024-06-10 VITALS
HEIGHT: 61 IN | DIASTOLIC BLOOD PRESSURE: 69 MMHG | WEIGHT: 150 LBS | BODY MASS INDEX: 28.32 KG/M2 | SYSTOLIC BLOOD PRESSURE: 95 MMHG

## 2024-06-10 DIAGNOSIS — Z87.59 HISTORY OF IUFD: ICD-10-CM

## 2024-06-10 DIAGNOSIS — F19.10 SUBSTANCE ABUSE AFFECTING PREGNANCY, ANTEPARTUM: Primary | ICD-10-CM

## 2024-06-10 DIAGNOSIS — O36.5920 MATERNAL CARE FOR OTHER KNOWN OR SUSPECTED POOR FETAL GROWTH, SECOND TRIMESTER, NOT APPLICABLE OR UNSPECIFIED: ICD-10-CM

## 2024-06-10 DIAGNOSIS — Z34.90 PREGNANCY, UNSPECIFIED GESTATIONAL AGE: ICD-10-CM

## 2024-06-10 DIAGNOSIS — O36.5931 IUGR (INTRAUTERINE GROWTH RESTRICTION) AFFECTING CARE OF MOTHER, THIRD TRIMESTER, FETUS 1: ICD-10-CM

## 2024-06-10 DIAGNOSIS — O99.320 SUBSTANCE ABUSE AFFECTING PREGNANCY, ANTEPARTUM: Primary | ICD-10-CM

## 2024-06-10 PROCEDURE — 76811 OB US DETAILED SNGL FETUS: CPT

## 2024-06-10 PROCEDURE — 76811 OB US DETAILED SNGL FETUS: CPT | Performed by: OBSTETRICS & GYNECOLOGY

## 2024-06-10 PROCEDURE — 76820 UMBILICAL ARTERY ECHO: CPT | Performed by: OBSTETRICS & GYNECOLOGY

## 2024-06-10 PROCEDURE — 76820 UMBILICAL ARTERY ECHO: CPT

## 2024-06-10 PROCEDURE — 76819 FETAL BIOPHYS PROFIL W/O NST: CPT

## 2024-06-10 PROCEDURE — 76819 FETAL BIOPHYS PROFIL W/O NST: CPT | Performed by: OBSTETRICS & GYNECOLOGY

## 2024-06-10 PROCEDURE — 99203 OFFICE O/P NEW LOW 30 MIN: CPT | Performed by: OBSTETRICS & GYNECOLOGY

## 2024-06-10 RX ORDER — PRENATAL VIT/IRON FUM/FOLIC AC 27MG-0.8MG
1 TABLET ORAL DAILY
COMMUNITY
Start: 2024-04-29 | End: 2024-06-10 | Stop reason: SDUPTHER

## 2024-06-10 NOTE — ASSESSMENT & PLAN NOTE
"Pt has a history of \"full term appearing\" IUFD in 2021. She had not had prenatal care but then presented to an OSH in  labor. She was transferred to  and upon arrival she was found to have an IUFD. She was also diagnosed with an active COVID infection at the time. Additionally, she was actively using heroin and BZD daily at that point in her life.   "

## 2024-06-10 NOTE — ASSESSMENT & PLAN NOTE
Borderline IUGR noted today with AC measuring at the 4th percentile. Reviewed with patient the importance of not smoking, of eating healthy foods, and of resting.      - Follow-up scheduled in 3wks to repeat growth

## 2024-06-10 NOTE — LETTER
"Negin 10, 2024     SURINDER Miguel  [address]  [address]  [place] [address]    Patient: Anna Calix   YOB: 1990   Date of Visit: 6/10/2024       Dear SURINDER Miguel,    Thank you for referring Anna Calix to me for evaluation. Below is a copy of my consult note.    If you have questions, please do not hesitate to call me. I look forward to following Anna along with you.         Sincerely,        Donna Forde MD        CC: No Recipients                      Maternal/Fetal Medicine Consult Note     Name: Anna Calix    : 1990     MRN: [id number]     Referring Provider: SURINDER Miguel    Chief Complaint  Possible IUGR; Hx term IUFD ; Hx substance abuse    Subjective     History of Present Illness:  Anna Calix is a 33 y.o.  26w6d who presents today for a fetal anatomic survey and growth assessment.     Pt denies LOF/VB/ctx's. +FM.     AG: Estimated Date of Delivery: 9/10/24     ROS:   As noted in HPI.     Past Medical History:   Diagnosis Date   • History of IUFD 2020    term   • Substance abuse 2022    sober for 2 years   • Withdrawal symptoms, drug or narcotic       Past Surgical History:   Procedure Laterality Date   • VAGINAL DELIVERY        OB History          5    Para   4    Term   4       0    AB   0    Living   3         SAB   0    IAB   0    Ectopic   0    Molar   0    Multiple   0    Live Births   3          Obstetric Comments   FOB 1- 1st preg and 3rd preg  FOB 2- 2nd preg  FOB 3- 4th preg  FOB 4- 5th preg               Objective     Vital Signs  BP 95/69   Ht 153.7 cm (60.5\")   Wt 68 kg (150 lb)   LMP 2023 (Approximate)   Estimated body mass index is 28.81 kg/m² as calculated from the following:    Height as of this encounter: 153.7 cm (60.5\").    Weight as of this encounter: 68 kg (150 lb).    Physical Exam  Constitutional:       Appearance: Normal appearance. She is normal weight.   HENT: " "     Head: Normocephalic and atraumatic.   Pulmonary:      Effort: Pulmonary effort is normal.   Musculoskeletal:         General: Normal range of motion.   Neurological:      General: No focal deficit present.      Mental Status: She is alert and oriented to person, place, and time.   Psychiatric:         Mood and Affect: Mood normal.         Behavior: Behavior normal.         Thought Content: Thought content normal.         Judgment: Judgment normal.       Ultrasound Impression:   Vtx, EFW 24th%, AC 4th%, nl LISSA, posterior/fundal placenta, nl CL, nl anatomy, nl UA Dopplers.     Assessment and Plan     Diagnoses and all orders for this visit:    1. Substance abuse affecting pregnancy, antepartum (Primary)  Assessment & Plan:  Patient give history of being clean for 2yrs. She is currently working at a rehab facility. Her last positive drug screen in our system was 2023.       2. IUGR (intrauterine growth restriction) affecting care of mother, third trimester, fetus 1  Assessment & Plan:  Borderline IUGR noted today with AC measuring at the 4th percentile. Reviewed with patient the importance of not smoking, of eating healthy foods, and of resting.      - Follow-up scheduled in 3wks to repeat growth      3. History of IUFD  Assessment & Plan:  Pt has a history of \"full term appearing\" IUFD in 2021. She had not had prenatal care but then presented to an OSH in  labor. She was transferred to  and upon arrival she was found to have an IUFD. She was also diagnosed with an active COVID infection at the time. Additionally, she was actively using heroin and BZD daily at that point in her life.            Follow Up  Return in about 3 weeks (around 2024).    I spent 30 minutes caring for the patient on the day of service. This included: obtaining or reviewing a separately obtained medical history, reviewing patient records, performing a medically appropriate exam and/or evaluation, counseling or educating the " patient/family/caregiver, ordering medications, labs, and/or procedures and documenting such in the medical record. This does not include time spent on review and interpretation of other tests such as fetal ultrasound or the performance of other procedures such as amniocentesis or CVS.      Donna Forde MD  06/10/2024

## 2024-06-10 NOTE — PROGRESS NOTES
"    Maternal/Fetal Medicine Consult Note     Name: Anna Calix    : 1990     MRN: [id number]     Referring Provider: SURINDER Miguel    Chief Complaint  Possible IUGR; Hx term IUFD ; Hx substance abuse    Subjective     History of Present Illness:  Anna Calix is a 33 y.o.  26w6d who presents today for a fetal anatomic survey and growth assessment.     Pt denies LOF/VB/ctx's. +FM.     AG: Estimated Date of Delivery: 9/10/24     ROS:   As noted in HPI.     Past Medical History:   Diagnosis Date    History of IUFD     term    Substance abuse 2022    sober for 2 years    Withdrawal symptoms, drug or narcotic       Past Surgical History:   Procedure Laterality Date    VAGINAL DELIVERY        OB History          5    Para   4    Term   4       0    AB   0    Living   3         SAB   0    IAB   0    Ectopic   0    Molar   0    Multiple   0    Live Births   3          Obstetric Comments   FOB 1- 1st preg and 3rd preg  FOB 2- 2nd preg  FOB 3- 4th preg  FOB 4- 5th preg               Objective     Vital Signs  BP 95/69   Ht 153.7 cm (60.5\")   Wt 68 kg (150 lb)   LMP 2023 (Approximate)   Estimated body mass index is 28.81 kg/m² as calculated from the following:    Height as of this encounter: 153.7 cm (60.5\").    Weight as of this encounter: 68 kg (150 lb).    Physical Exam  Constitutional:       Appearance: Normal appearance. She is normal weight.   HENT:      Head: Normocephalic and atraumatic.   Pulmonary:      Effort: Pulmonary effort is normal.   Musculoskeletal:         General: Normal range of motion.   Neurological:      General: No focal deficit present.      Mental Status: She is alert and oriented to person, place, and time.   Psychiatric:         Mood and Affect: Mood normal.         Behavior: Behavior normal.         Thought Content: Thought content normal.         Judgment: Judgment normal.       Ultrasound Impression:   Vtx, EFW 24th%, AC 4th%, nl " "LISSA, posterior/fundal placenta, nl CL, nl anatomy, nl UA Dopplers.     Assessment and Plan     Diagnoses and all orders for this visit:    1. Substance abuse affecting pregnancy, antepartum (Primary)  Assessment & Plan:  Patient give history of being clean for 2yrs. She is currently working at a rehab facility. Her last positive drug screen in our system was 2023.       2. IUGR (intrauterine growth restriction) affecting care of mother, third trimester, fetus 1  Assessment & Plan:  Borderline IUGR noted today with AC measuring at the 4th percentile. Reviewed with patient the importance of not smoking, of eating healthy foods, and of resting.      - Follow-up scheduled in 3wks to repeat growth      3. History of IUFD  Assessment & Plan:  Pt has a history of \"full term appearing\" IUFD in 2021. She had not had prenatal care but then presented to an OSH in  labor. She was transferred to  and upon arrival she was found to have an IUFD. She was also diagnosed with an active COVID infection at the time. Additionally, she was actively using heroin and BZD daily at that point in her life.            Follow Up  Return in about 3 weeks (around 2024).    I spent 30 minutes caring for the patient on the day of service. This included: obtaining or reviewing a separately obtained medical history, reviewing patient records, performing a medically appropriate exam and/or evaluation, counseling or educating the patient/family/caregiver, ordering medications, labs, and/or procedures and documenting such in the medical record. This does not include time spent on review and interpretation of other tests such as fetal ultrasound or the performance of other procedures such as amniocentesis or CVS.      Donna Forde MD  06/10/2024  "

## 2024-06-10 NOTE — ASSESSMENT & PLAN NOTE
Patient give history of being clean for 2yrs. She is currently working at a rehab facility. Her last positive drug screen in our system was 1/2023.

## 2024-07-02 ENCOUNTER — OFFICE VISIT (OUTPATIENT)
Dept: OBSTETRICS AND GYNECOLOGY | Facility: HOSPITAL | Age: 34
End: 2024-07-02
Payer: MEDICAID

## 2024-07-02 ENCOUNTER — HOSPITAL ENCOUNTER (OUTPATIENT)
Dept: WOMENS IMAGING | Facility: HOSPITAL | Age: 34
Discharge: HOME | End: 2024-07-02
Admitting: OBSTETRICS & GYNECOLOGY
Payer: MEDICAID

## 2024-07-02 VITALS — SYSTOLIC BLOOD PRESSURE: 109 MMHG | BODY MASS INDEX: 29.97 KG/M2 | WEIGHT: 156 LBS | DIASTOLIC BLOOD PRESSURE: 56 MMHG

## 2024-07-02 DIAGNOSIS — Z87.59 HISTORY OF IUFD: ICD-10-CM

## 2024-07-02 DIAGNOSIS — O99.320 SUBSTANCE ABUSE AFFECTING PREGNANCY, ANTEPARTUM: ICD-10-CM

## 2024-07-02 DIAGNOSIS — F19.10 SUBSTANCE ABUSE AFFECTING PREGNANCY, ANTEPARTUM: ICD-10-CM

## 2024-07-02 DIAGNOSIS — O36.5931 IUGR (INTRAUTERINE GROWTH RESTRICTION) AFFECTING CARE OF MOTHER, THIRD TRIMESTER, FETUS 1: ICD-10-CM

## 2024-07-02 DIAGNOSIS — O36.5931 IUGR (INTRAUTERINE GROWTH RESTRICTION) AFFECTING CARE OF MOTHER, THIRD TRIMESTER, FETUS 1: Primary | ICD-10-CM

## 2024-07-02 PROCEDURE — 76820 UMBILICAL ARTERY ECHO: CPT

## 2024-07-02 PROCEDURE — 76819 FETAL BIOPHYS PROFIL W/O NST: CPT

## 2024-07-02 PROCEDURE — 76816 OB US FOLLOW-UP PER FETUS: CPT

## 2024-07-02 NOTE — LETTER
July 3, 2024       No Recipients    Patient: Anna Calix   YOB: 1990   Date of Visit: 2024       Dear Lovely Angeles, APRN:    Thank you for referring Anna Calix to me for evaluation. Below are the relevant portions of my assessment and plan of care.    Encounter Diagnosis and Orders:  Diagnoses and all orders for this visit:    1. IUGR (intrauterine growth restriction) affecting care of mother, third trimester, fetus 1 (Primary)  Assessment & Plan:  Follow up growth today appears reassuring   Considering prior IUFD, recommend twice weekly  testing in your office   Follow up with MFM in 4 weeks is scheduled   Delivery at 37 weeks GA in the setting of prior term IUFD is appropriate     Orders:  -     [facility]; [facility]    2. History of IUFD  -     [facility]        If you have questions, please do not hesitate to call me. I look forward to following Anna along with you.         Sincerely,        Anuja Asencio MD        CC:   No Recipients

## 2024-07-03 NOTE — ASSESSMENT & PLAN NOTE
Follow up growth today appears reassuring   Considering prior IUFD, recommend twice weekly  testing in your office   Follow up with MFM in 4 weeks is scheduled   Delivery at 37 weeks GA in the setting of prior term IUFD is appropriate

## 2024-07-03 NOTE — PROGRESS NOTES
"    Maternal/Fetal Medicine Follow Up Note     Name: Anna Calix    : 1990     MRN: [id number]     Referring Provider: SURINDER Miguel    Chief Complaint  AC lag  Prior term IUFD   Substance abuse history     Subjective     History of Present Illness:  Anna Calix is a 33 y.o.  30w1d who presents today for follow up in the setting of prior term IUFD and current AC lag   Prior IUFD was in the context of active polysubstance abuse. Patient now stable.   No interval issues   +FM. No LOF, VB, CTX     AG: Estimated Date of Delivery: 9/10/24     ROS:   As noted in HPI.     Objective     Vital Signs  /56   Wt 70.8 kg (156 lb)   LMP 2023 (Approximate)   Estimated body mass index is 29.97 kg/m² as calculated from the following:    Height as of 6/10/24: 153.7 cm (60.5\").    Weight as of this encounter: 70.8 kg (156 lb).    Ultrasound Impression:   See viewpoint      Assessment and Plan     Anna Calix is a 33 y.o.  30w1d     Diagnoses and all orders for this visit:    1. IUGR (intrauterine growth restriction) affecting care of mother, third trimester, fetus 1 (Primary)  Assessment & Plan:  Follow up growth today appears reassuring   Considering prior IUFD, recommend twice weekly  testing in your office   Follow up with MFM in 4 weeks is scheduled   Delivery at 37 weeks GA in the setting of prior term IUFD is appropriate     Orders:  -     [facility]; Future    2. History of IUFD  -     [facility]; [facility]         Follow Up  4 weeks     I spent 20 minutes caring for the patient on the day of service. This included: obtaining or reviewing a separately obtained medical history, reviewing patient records, performing a medically appropriate exam and/or evaluation, counseling or educating the patient/family/caregiver, ordering medications, labs, and/or procedures and documenting such in the medical record. This does not include " time spent on review and interpretation of other tests such as fetal ultrasound or the performance of other procedures such as amniocentesis or CVS.    Anuja Asencio MD FACOG  Maternal Fetal Medicine, [facility]     2024

## 2024-07-30 ENCOUNTER — OFFICE VISIT (OUTPATIENT)
Dept: OBSTETRICS AND GYNECOLOGY | Facility: HOSPITAL | Age: 34
End: 2024-07-30
Payer: MEDICAID

## 2024-07-30 ENCOUNTER — HOSPITAL ENCOUNTER (OUTPATIENT)
Dept: WOMENS IMAGING | Facility: HOSPITAL | Age: 34
Discharge: HOME | End: 2024-07-30
Admitting: OBSTETRICS & GYNECOLOGY
Payer: MEDICAID

## 2024-07-30 VITALS — DIASTOLIC BLOOD PRESSURE: 77 MMHG | WEIGHT: 161 LBS | BODY MASS INDEX: 30.93 KG/M2 | SYSTOLIC BLOOD PRESSURE: 108 MMHG

## 2024-07-30 DIAGNOSIS — O36.5931 IUGR (INTRAUTERINE GROWTH RESTRICTION) AFFECTING CARE OF MOTHER, THIRD TRIMESTER, FETUS 1: ICD-10-CM

## 2024-07-30 DIAGNOSIS — Z87.59 HISTORY OF IUFD: ICD-10-CM

## 2024-07-30 DIAGNOSIS — Z87.59 HISTORY OF IUFD: Primary | ICD-10-CM

## 2024-07-30 PROCEDURE — 76819 FETAL BIOPHYS PROFIL W/O NST: CPT

## 2024-07-30 PROCEDURE — 76816 OB US FOLLOW-UP PER FETUS: CPT

## 2024-07-30 NOTE — PROGRESS NOTES
Patient denies bleeding, leaking fluid or contractions  NIPT negative  Patients next follow up with CHARY CADENA is 8/2/24

## 2024-08-09 LAB — EXTERNAL GROUP B STREP ANTIGEN: NEGATIVE

## 2024-08-19 ENCOUNTER — HOSPITAL ENCOUNTER (INPATIENT)
Facility: HOSPITAL | Age: 34
LOS: 3 days | Discharge: HOME | End: 2024-08-22
Attending: OBSTETRICS & GYNECOLOGY | Admitting: OBSTETRICS & GYNECOLOGY
Payer: MEDICAID

## 2024-08-19 ENCOUNTER — HOSPITAL ENCOUNTER (INPATIENT)
Dept: LABOR AND DELIVERY | Facility: HOSPITAL | Age: 34
Discharge: HOME | End: 2024-08-19
Payer: MEDICAID

## 2024-08-19 LAB
ABO GROUP BLD: NORMAL
AMPHET+METHAMPHET UR QL: NEGATIVE
AMPHETAMINES UR QL: NEGATIVE
BARBITURATES UR QL SCN: NEGATIVE
BASOPHILS # BLD AUTO: 0.03 10*3/MM3 (ref 0–0.2)
BASOPHILS NFR BLD AUTO: 0.4 % (ref 0–1.5)
BENZODIAZ UR QL SCN: NEGATIVE
BLD GP AB SCN SERPL QL: NEGATIVE
BUPRENORPHINE SERPL-MCNC: NEGATIVE NG/ML
CANNABINOIDS SERPL QL: NEGATIVE
COCAINE UR QL: NEGATIVE
DEPRECATED RDW RBC AUTO: 48.1 FL (ref 37–54)
EOSINOPHIL # BLD AUTO: 0.05 10*3/MM3 (ref 0–0.4)
EOSINOPHIL NFR BLD AUTO: 0.7 % (ref 0.3–6.2)
ERYTHROCYTE [DISTWIDTH] IN BLOOD BY AUTOMATED COUNT: 13.7 % (ref 12.3–15.4)
FENTANYL UR-MCNC: NEGATIVE NG/ML
HCT VFR BLD AUTO: 35.3 % (ref 34–46.6)
HGB BLD-MCNC: 11.8 G/DL (ref 12–15.9)
IMM GRANULOCYTES # BLD AUTO: 0.24 10*3/MM3 (ref 0–0.05)
IMM GRANULOCYTES NFR BLD AUTO: 3.2 % (ref 0–0.5)
LYMPHOCYTES # BLD AUTO: 1.46 10*3/MM3 (ref 0.7–3.1)
LYMPHOCYTES NFR BLD AUTO: 19.6 % (ref 19.6–45.3)
MCH RBC QN AUTO: 32.3 PG (ref 26.6–33)
MCHC RBC AUTO-ENTMCNC: 33.4 G/DL (ref 31.5–35.7)
MCV RBC AUTO: 96.7 FL (ref 79–97)
METHADONE UR QL SCN: NEGATIVE
MONOCYTES # BLD AUTO: 0.46 10*3/MM3 (ref 0.1–0.9)
MONOCYTES NFR BLD AUTO: 6.2 % (ref 5–12)
NEUTROPHILS NFR BLD AUTO: 5.21 10*3/MM3 (ref 1.7–7)
NEUTROPHILS NFR BLD AUTO: 69.9 % (ref 42.7–76)
NRBC BLD AUTO-RTO: 0 /100 WBC (ref 0–0.2)
OPIATES UR QL: NEGATIVE
OXYCODONE UR QL SCN: NEGATIVE
PCP UR QL SCN: NEGATIVE
PLATELET # BLD AUTO: 129 10*3/MM3 (ref 140–450)
PMV BLD AUTO: 11.2 FL (ref 6–12)
RBC # BLD AUTO: 3.65 10*6/MM3 (ref 3.77–5.28)
RH BLD: POSITIVE
T&S EXPIRATION DATE: NORMAL
TRICYCLICS UR QL SCN: NEGATIVE
WBC NRBC COR # BLD AUTO: 7.45 10*3/MM3 (ref 3.4–10.8)

## 2024-08-19 PROCEDURE — 85025 COMPLETE CBC W/AUTO DIFF WBC: CPT | Performed by: OBSTETRICS & GYNECOLOGY

## 2024-08-19 PROCEDURE — 86850 RBC ANTIBODY SCREEN: CPT | Performed by: OBSTETRICS & GYNECOLOGY

## 2024-08-19 PROCEDURE — 86780 TREPONEMA PALLIDUM: CPT | Performed by: OBSTETRICS & GYNECOLOGY

## 2024-08-19 PROCEDURE — 86900 BLOOD TYPING SEROLOGIC ABO: CPT | Performed by: OBSTETRICS & GYNECOLOGY

## 2024-08-19 PROCEDURE — 80307 DRUG TEST PRSMV CHEM ANLYZR: CPT | Performed by: OBSTETRICS & GYNECOLOGY

## 2024-08-19 PROCEDURE — 86901 BLOOD TYPING SEROLOGIC RH(D): CPT | Performed by: OBSTETRICS & GYNECOLOGY

## 2024-08-19 PROCEDURE — 59025 FETAL NON-STRESS TEST: CPT

## 2024-08-19 RX ORDER — SODIUM CHLORIDE 9 MG/ML
40 INJECTION, SOLUTION INTRAVENOUS AS NEEDED
Status: DISCONTINUED | OUTPATIENT
Start: 2024-08-19 | End: 2024-08-20 | Stop reason: HOSPADM

## 2024-08-19 RX ORDER — ONDANSETRON 2 MG/ML
4 INJECTION INTRAMUSCULAR; INTRAVENOUS EVERY 6 HOURS PRN
Status: DISCONTINUED | OUTPATIENT
Start: 2024-08-19 | End: 2024-08-20 | Stop reason: HOSPADM

## 2024-08-19 RX ORDER — ACETAMINOPHEN 325 MG/1
650 TABLET ORAL EVERY 4 HOURS PRN
Status: DISCONTINUED | OUTPATIENT
Start: 2024-08-19 | End: 2024-08-20 | Stop reason: HOSPADM

## 2024-08-19 RX ORDER — MISOPROSTOL 100 MCG
25 TABLET ORAL EVERY 4 HOURS PRN
Status: COMPLETED | OUTPATIENT
Start: 2024-08-20 | End: 2024-08-20

## 2024-08-19 RX ORDER — BUTORPHANOL TARTRATE 1 MG/ML
1 INJECTION, SOLUTION INTRAMUSCULAR; INTRAVENOUS
Status: DISCONTINUED | OUTPATIENT
Start: 2024-08-19 | End: 2024-08-20 | Stop reason: HOSPADM

## 2024-08-19 RX ORDER — TERBUTALINE SULFATE 1 MG/ML
0.2 INJECTION, SOLUTION SUBCUTANEOUS AS NEEDED
Status: DISCONTINUED | OUTPATIENT
Start: 2024-08-19 | End: 2024-08-20 | Stop reason: HOSPADM

## 2024-08-19 RX ORDER — MAGNESIUM HYDROXIDE 1200 MG/15ML
1000 LIQUID ORAL ONCE AS NEEDED
Status: DISCONTINUED | OUTPATIENT
Start: 2024-08-19 | End: 2024-08-20 | Stop reason: HOSPADM

## 2024-08-19 RX ORDER — SODIUM CHLORIDE 0.9 % (FLUSH) 0.9 %
10 SYRINGE (ML) INJECTION AS NEEDED
Status: DISCONTINUED | OUTPATIENT
Start: 2024-08-19 | End: 2024-08-20 | Stop reason: HOSPADM

## 2024-08-19 RX ORDER — SODIUM CHLORIDE, SODIUM LACTATE, POTASSIUM CHLORIDE, CALCIUM CHLORIDE 600; 310; 30; 20 MG/100ML; MG/100ML; MG/100ML; MG/100ML
125 INJECTION, SOLUTION INTRAVENOUS CONTINUOUS
Status: DISCONTINUED | OUTPATIENT
Start: 2024-08-19 | End: 2024-08-20

## 2024-08-19 RX ORDER — ONDANSETRON 4 MG/1
4 TABLET, ORALLY DISINTEGRATING ORAL EVERY 6 HOURS PRN
Status: DISCONTINUED | OUTPATIENT
Start: 2024-08-19 | End: 2024-08-20 | Stop reason: HOSPADM

## 2024-08-19 RX ORDER — OXYTOCIN/0.9 % SODIUM CHLORIDE 30/500 ML
2-20 PLASTIC BAG, INJECTION (ML) INTRAVENOUS
Status: DISCONTINUED | OUTPATIENT
Start: 2024-08-20 | End: 2024-08-20 | Stop reason: HOSPADM

## 2024-08-20 ENCOUNTER — ANESTHESIA EVENT (OUTPATIENT)
Dept: LABOR AND DELIVERY | Facility: HOSPITAL | Age: 34
End: 2024-08-20
Payer: MEDICAID

## 2024-08-20 ENCOUNTER — ANESTHESIA (OUTPATIENT)
Dept: LABOR AND DELIVERY | Facility: HOSPITAL | Age: 34
End: 2024-08-20
Payer: MEDICAID

## 2024-08-20 LAB — TREPONEMA PALLIDUM IGG+IGM AB [PRESENCE] IN SERUM OR PLASMA BY IMMUNOASSAY: NORMAL

## 2024-08-20 PROCEDURE — 59025 FETAL NON-STRESS TEST: CPT

## 2024-08-20 PROCEDURE — 25010000002 BUPIVACAINE (PF) 0.25 % SOLUTION: Performed by: ANESTHESIOLOGY

## 2024-08-20 PROCEDURE — 25010000002 BUTORPHANOL PER 1 MG: Performed by: OBSTETRICS & GYNECOLOGY

## 2024-08-20 PROCEDURE — 25810000003 LACTATED RINGERS PER 1000 ML: Performed by: OBSTETRICS & GYNECOLOGY

## 2024-08-20 PROCEDURE — C1755 CATHETER, INTRASPINAL: HCPCS

## 2024-08-20 RX ORDER — BUPIVACAINE HYDROCHLORIDE 2.5 MG/ML
INJECTION, SOLUTION EPIDURAL; INFILTRATION; INTRACAUDAL AS NEEDED
Status: DISCONTINUED | OUTPATIENT
Start: 2024-08-20 | End: 2024-08-20 | Stop reason: SURG

## 2024-08-20 RX ORDER — ACETAMINOPHEN 325 MG/1
650 TABLET ORAL EVERY 4 HOURS PRN
Status: DISCONTINUED | OUTPATIENT
Start: 2024-08-20 | End: 2024-08-20 | Stop reason: HOSPADM

## 2024-08-20 RX ORDER — OXYTOCIN/0.9 % SODIUM CHLORIDE 30/500 ML
125 PLASTIC BAG, INJECTION (ML) INTRAVENOUS CONTINUOUS PRN
Status: DISCONTINUED | OUTPATIENT
Start: 2024-08-20 | End: 2024-08-22 | Stop reason: HOSPADM

## 2024-08-20 RX ORDER — IBUPROFEN 800 MG/1
800 TABLET, FILM COATED ORAL EVERY 8 HOURS SCHEDULED
Status: DISCONTINUED | OUTPATIENT
Start: 2024-08-20 | End: 2024-08-20 | Stop reason: HOSPADM

## 2024-08-20 RX ORDER — HYDROCODONE BITARTRATE AND ACETAMINOPHEN 5; 325 MG/1; MG/1
1 TABLET ORAL EVERY 4 HOURS PRN
Status: DISCONTINUED | OUTPATIENT
Start: 2024-08-20 | End: 2024-08-20 | Stop reason: HOSPADM

## 2024-08-20 RX ORDER — ONDANSETRON 4 MG/1
4 TABLET, ORALLY DISINTEGRATING ORAL EVERY 6 HOURS PRN
Status: DISCONTINUED | OUTPATIENT
Start: 2024-08-20 | End: 2024-08-22 | Stop reason: HOSPADM

## 2024-08-20 RX ORDER — OXYTOCIN/0.9 % SODIUM CHLORIDE 30/500 ML
250 PLASTIC BAG, INJECTION (ML) INTRAVENOUS CONTINUOUS
Status: ACTIVE | OUTPATIENT
Start: 2024-08-20 | End: 2024-08-20

## 2024-08-20 RX ORDER — BISACODYL 10 MG
10 SUPPOSITORY, RECTAL RECTAL DAILY PRN
Status: DISCONTINUED | OUTPATIENT
Start: 2024-08-21 | End: 2024-08-22 | Stop reason: HOSPADM

## 2024-08-20 RX ORDER — HYDROCORTISONE 25 MG/G
1 CREAM TOPICAL AS NEEDED
Status: DISCONTINUED | OUTPATIENT
Start: 2024-08-20 | End: 2024-08-22 | Stop reason: HOSPADM

## 2024-08-20 RX ORDER — ACETAMINOPHEN 325 MG/1
650 TABLET ORAL EVERY 6 HOURS PRN
Status: DISCONTINUED | OUTPATIENT
Start: 2024-08-20 | End: 2024-08-22 | Stop reason: HOSPADM

## 2024-08-20 RX ORDER — ONDANSETRON 2 MG/ML
4 INJECTION INTRAMUSCULAR; INTRAVENOUS ONCE AS NEEDED
Status: DISCONTINUED | OUTPATIENT
Start: 2024-08-20 | End: 2024-08-20 | Stop reason: HOSPADM

## 2024-08-20 RX ORDER — METHYLERGONOVINE MALEATE 0.2 MG/ML
200 INJECTION INTRAVENOUS ONCE AS NEEDED
Status: DISCONTINUED | OUTPATIENT
Start: 2024-08-20 | End: 2024-08-20 | Stop reason: HOSPADM

## 2024-08-20 RX ORDER — METHYLERGONOVINE MALEATE 0.2 MG/ML
200 INJECTION INTRAVENOUS ONCE AS NEEDED
Status: DISCONTINUED | OUTPATIENT
Start: 2024-08-20 | End: 2024-08-22 | Stop reason: HOSPADM

## 2024-08-20 RX ORDER — PRENATAL VIT/IRON FUM/FOLIC AC 27MG-0.8MG
1 TABLET ORAL DAILY
Status: DISCONTINUED | OUTPATIENT
Start: 2024-08-21 | End: 2024-08-20

## 2024-08-20 RX ORDER — MISOPROSTOL 100 UG/1
600 TABLET ORAL ONCE AS NEEDED
Status: DISCONTINUED | OUTPATIENT
Start: 2024-08-20 | End: 2024-08-22 | Stop reason: HOSPADM

## 2024-08-20 RX ORDER — HYDROCODONE BITARTRATE AND ACETAMINOPHEN 10; 325 MG/1; MG/1
1 TABLET ORAL EVERY 4 HOURS PRN
Status: DISCONTINUED | OUTPATIENT
Start: 2024-08-20 | End: 2024-08-22 | Stop reason: HOSPADM

## 2024-08-20 RX ORDER — MISOPROSTOL 100 UG/1
600 TABLET ORAL AS NEEDED
Status: DISCONTINUED | OUTPATIENT
Start: 2024-08-20 | End: 2024-08-20 | Stop reason: HOSPADM

## 2024-08-20 RX ORDER — SODIUM CHLORIDE 0.9 % (FLUSH) 0.9 %
1-10 SYRINGE (ML) INJECTION AS NEEDED
Status: DISCONTINUED | OUTPATIENT
Start: 2024-08-20 | End: 2024-08-22 | Stop reason: HOSPADM

## 2024-08-20 RX ORDER — CARBOPROST TROMETHAMINE 250 UG/ML
250 INJECTION, SOLUTION INTRAMUSCULAR
Status: DISCONTINUED | OUTPATIENT
Start: 2024-08-20 | End: 2024-08-22 | Stop reason: HOSPADM

## 2024-08-20 RX ORDER — HYDROCORTISONE ACETATE PRAMOXINE HCL 1; 1 G/100G; G/100G
1 CREAM TOPICAL AS NEEDED
Status: DISCONTINUED | OUTPATIENT
Start: 2024-08-20 | End: 2024-08-22 | Stop reason: HOSPADM

## 2024-08-20 RX ORDER — MISOPROSTOL 100 UG/1
25 TABLET ORAL EVERY 4 HOURS PRN
Status: DISCONTINUED | OUTPATIENT
Start: 2024-08-20 | End: 2024-08-20

## 2024-08-20 RX ORDER — OXYTOCIN/0.9 % SODIUM CHLORIDE 30/500 ML
999 PLASTIC BAG, INJECTION (ML) INTRAVENOUS ONCE
Status: COMPLETED | OUTPATIENT
Start: 2024-08-20 | End: 2024-08-20

## 2024-08-20 RX ORDER — HYDROCODONE BITARTRATE AND ACETAMINOPHEN 5; 325 MG/1; MG/1
1 TABLET ORAL EVERY 4 HOURS PRN
Status: DISCONTINUED | OUTPATIENT
Start: 2024-08-20 | End: 2024-08-22 | Stop reason: HOSPADM

## 2024-08-20 RX ORDER — FAMOTIDINE 10 MG/ML
20 INJECTION, SOLUTION INTRAVENOUS ONCE AS NEEDED
Status: DISCONTINUED | OUTPATIENT
Start: 2024-08-20 | End: 2024-08-20 | Stop reason: HOSPADM

## 2024-08-20 RX ORDER — EPHEDRINE SULFATE 5 MG/ML
10 INJECTION INTRAVENOUS
Status: DISCONTINUED | OUTPATIENT
Start: 2024-08-20 | End: 2024-08-20 | Stop reason: HOSPADM

## 2024-08-20 RX ORDER — HYDROXYZINE HYDROCHLORIDE 25 MG/1
50 TABLET, FILM COATED ORAL NIGHTLY PRN
Status: DISCONTINUED | OUTPATIENT
Start: 2024-08-20 | End: 2024-08-22 | Stop reason: HOSPADM

## 2024-08-20 RX ORDER — IBUPROFEN 800 MG/1
800 TABLET, FILM COATED ORAL 3 TIMES DAILY
Status: DISCONTINUED | OUTPATIENT
Start: 2024-08-20 | End: 2024-08-22 | Stop reason: HOSPADM

## 2024-08-20 RX ORDER — ROPIVACAINE HYDROCHLORIDE 2 MG/ML
14 INJECTION, SOLUTION EPIDURAL; INFILTRATION; PERINEURAL CONTINUOUS
Status: DISCONTINUED | OUTPATIENT
Start: 2024-08-20 | End: 2024-08-20

## 2024-08-20 RX ORDER — ONDANSETRON 2 MG/ML
4 INJECTION INTRAMUSCULAR; INTRAVENOUS EVERY 6 HOURS PRN
Status: DISCONTINUED | OUTPATIENT
Start: 2024-08-20 | End: 2024-08-22 | Stop reason: HOSPADM

## 2024-08-20 RX ORDER — DOCUSATE SODIUM 100 MG/1
100 CAPSULE, LIQUID FILLED ORAL 2 TIMES DAILY
Status: DISCONTINUED | OUTPATIENT
Start: 2024-08-21 | End: 2024-08-22 | Stop reason: HOSPADM

## 2024-08-20 RX ORDER — PRENATAL VIT/IRON FUM/FOLIC AC 27MG-0.8MG
1 TABLET ORAL DAILY
Status: DISCONTINUED | OUTPATIENT
Start: 2024-08-21 | End: 2024-08-22 | Stop reason: HOSPADM

## 2024-08-20 RX ORDER — CARBOPROST TROMETHAMINE 250 UG/ML
250 INJECTION, SOLUTION INTRAMUSCULAR AS NEEDED
Status: DISCONTINUED | OUTPATIENT
Start: 2024-08-20 | End: 2024-08-20 | Stop reason: HOSPADM

## 2024-08-20 RX ADMIN — BUTORPHANOL TARTRATE 2 MG: 2 INJECTION, SOLUTION INTRAMUSCULAR; INTRAVENOUS at 05:04

## 2024-08-20 RX ADMIN — HYDROCODONE BITARTRATE AND ACETAMINOPHEN 1 TABLET: 5; 325 TABLET ORAL at 18:42

## 2024-08-20 RX ADMIN — SODIUM CHLORIDE, POTASSIUM CHLORIDE, SODIUM LACTATE AND CALCIUM CHLORIDE 125 ML/HR: 600; 310; 30; 20 INJECTION, SOLUTION INTRAVENOUS at 00:02

## 2024-08-20 RX ADMIN — BUPIVACAINE HYDROCHLORIDE 10 ML: 2.5 INJECTION, SOLUTION EPIDURAL; INFILTRATION; INTRACAUDAL; PERINEURAL at 09:29

## 2024-08-20 RX ADMIN — IBUPROFEN 800 MG: 800 TABLET, FILM COATED ORAL at 21:03

## 2024-08-20 RX ADMIN — Medication 999 ML/HR: at 12:37

## 2024-08-20 RX ADMIN — IBUPROFEN 800 MG: 800 TABLET, FILM COATED ORAL at 16:19

## 2024-08-20 RX ADMIN — Medication 2 MILLI-UNITS/MIN: at 07:47

## 2024-08-20 RX ADMIN — Medication 25 MCG: at 00:02

## 2024-08-20 RX ADMIN — SODIUM CHLORIDE, POTASSIUM CHLORIDE, SODIUM LACTATE AND CALCIUM CHLORIDE 125 ML/HR: 600; 310; 30; 20 INJECTION, SOLUTION INTRAVENOUS at 05:06

## 2024-08-20 RX ADMIN — BUPIVACAINE HYDROCHLORIDE 10 ML: 2.5 INJECTION, SOLUTION EPIDURAL; INFILTRATION; INTRACAUDAL; PERINEURAL at 09:40

## 2024-08-20 NOTE — NON STRESS TEST
Anna Calix, a  at 36w6d with an AG of 9/10/2024, Date entered prior to episode creation, was seen at [facility] for a nonstress test.    Chief Complaint   Patient presents with    Scheduled Induction     HX OF IUFD       Patient Active Problem List   Diagnosis    Substance abuse affecting pregnancy, antepartum     (spontaneous vaginal delivery)    Opioid use disorder, severe, dependence    Severe benzodiazepine use disorder    Methamphetamine use disorder, severe    IUGR (intrauterine growth restriction) affecting care of mother, third trimester, fetus 1    History of IUFD       Start Time:   Stop Time:     Interpretation A  Nonstress Test Interpretation A: Reactive  Comments A: VERIFIED BY FE LIMON RN

## 2024-08-20 NOTE — H&P
CHIDI Watson  Obstetric History and Physical    Chief Complaint   Patient presents with    Scheduled Induction     HX OF IUFD       Subjective     Patient is a 34 y.o. female  currently at 37w0d, who presents with severe IUGR for IOL, h/o IUFD, maternal anxiety.    Her prenatal care is complicated by  abnormal fetal growth  IUGR.  Her previous obstetric/gynecological history is noted for is non-contributory.    The following portions of the patient's history were reviewed and updated as appropriate: current medications, allergies, past medical history, past surgical history, past family history, past social history, and problem list .   Social History     Socioeconomic History    Marital status: Single   Tobacco Use    Smoking status: Former    Smokeless tobacco: Never    Tobacco comments:     Quit smoking Caden 3, 2022   Vaping Use    Vaping status: Former    Substances: Nicotine, Flavoring    Devices: Disposable   Substance and Sexual Activity    Alcohol use: No    Drug use: Yes     Types: Heroin, Benzodiazepines     Comment: percocets, subutex 6yrs ago; sober date 2022    Sexual activity: Yes     Partners: Male     Birth control/protection: None     Past Medical History:   Diagnosis Date    History of IUFD     term    Substance abuse 2022    sober for 2 years    Withdrawal symptoms, drug or narcotic        Current Facility-Administered Medications:     acetaminophen (TYLENOL) tablet 650 mg, 650 mg, Oral, Q4H PRN, Marisol Orellana DO    butorphanol (STADOL) injection 1 mg, 1 mg, Intravenous, Q2H PRN, Marisol Orellana DO    butorphanol (STADOL) injection 2 mg, 2 mg, Intravenous, Q3H PRN, Marisol Orellana, , 2 mg at 24 0504    ePHEDrine Sulfate (Pressors) 5 MG/ML injection 10 mg, 10 mg, Intravenous, Q10 Min PRN, Kushal Felix MD    famotidine (PEPCID) injection 20 mg, 20 mg, Intravenous, Once PRN, Kushal Felix MD    lactated ringers bolus 1,000 mL, 1,000  mL, Intravenous, Once PRN, Marisol Orellana DO    lactated ringers bolus 1,000 mL, 1,000 mL, Intravenous, Once, Kushal Felix MD    lactated ringers infusion, 125 mL/hr, Intravenous, Continuous, Mariosl Orellana DO, Last Rate: 125 mL/hr at 08/20/24 0506, 125 mL/hr at 08/20/24 0506    miSOPROStol (CYTOTEC) split tablet 25 mcg, 25 mcg, Vaginal, Q4H PRN, Marisol Orellana DO    ondansetron ODT (ZOFRAN-ODT) disintegrating tablet 4 mg, 4 mg, Oral, Q6H PRN **OR** ondansetron (ZOFRAN) injection 4 mg, 4 mg, Intravenous, Q6H PRN, Marisol Orellana DO    ondansetron (ZOFRAN) injection 4 mg, 4 mg, Intravenous, Once PRN, Kushal Felix MD    oxytocin (PITOCIN) 30 units in 0.9% sodium chloride 500 mL (premix), 2-20 kacey-units/min, Intravenous, Titrated, Marisol Orellana DO, Last Rate: 6 mL/hr at 08/20/24 0915, 6 kacey-units/min at 08/20/24 0915    [START ON 8/21/2024] prenatal vitamin tablet 1 tablet, 1 tablet, Oral, Daily, Marisol Orellana DO    ropivacaine (NAROPIN) 0.2 % injection, 14 mL/hr, Epidural, Continuous, Kushal Felix MD    sodium chloride (NS) irrigation solution 1,000 mL, 1,000 mL, Irrigation, Once PRN, Marisol Orellana DO    sodium chloride 0.9 % flush 10 mL, 10 mL, Intravenous, PRN, Marisol Orellana DO    sodium chloride 0.9 % infusion 40 mL, 40 mL, Intravenous, PRN, Marisol Orellana DO    terbutaline (BRETHINE) injection 0.2 mg, 0.2 mg, Subcutaneous, PRN, Marisol Orellana DO  Allergies   Allergen Reactions    Penicillins Hives     Past Surgical History:   Procedure Laterality Date    VAGINAL DELIVERY           Prenatal Information:   Maternal Prenatal Labs  Blood Type ABO Type   Date Value Ref Range Status   08/19/2024 AB  Final      Rh Status RH type   Date Value Ref Range Status   08/19/2024 Positive  Final      Antibody Screen Antibody Screen   Date Value Ref Range Status   08/19/2024 Negative  Final     "  Rapid Urine Drug Screen Barbiturates Screen, Urine   Date Value Ref Range Status   08/19/2024 Negative Negative Final     Benzodiazepine Screen, Urine   Date Value Ref Range Status   08/19/2024 Negative Negative Final     Methadone Screen, Urine   Date Value Ref Range Status   08/19/2024 Negative Negative Final     Opiate Screen   Date Value Ref Range Status   08/19/2024 Negative Negative Final     THC, Screen, Urine   Date Value Ref Range Status   08/19/2024 Negative Negative Final     Cocaine Screen, Urine   Date Value Ref Range Status   08/19/2024 Negative Negative Final     Amphetamine Screen, Urine   Date Value Ref Range Status   08/19/2024 Negative Negative Final     Buprenorphine, Screen, Urine   Date Value Ref Range Status   08/19/2024 Negative Negative Final     Methamphetamine, Ur   Date Value Ref Range Status   08/19/2024 Negative Negative Final     Oxycodone Screen, Urine   Date Value Ref Range Status   08/19/2024 Negative Negative Final     Tricyclic Antidepressants Screen   Date Value Ref Range Status   08/19/2024 Negative Negative Final      Group B Strep Culture No results found for: \"GBSANTIGEN\"           External Prenatal Results       Pregnancy Outside Results - Transcribed From Office Records - See Scanned Records For Details       Test Value Date Time    ABO  AB  08/19/24 2146    Rh  Positive  08/19/24 2146    Antibody Screen  Negative  08/19/24 2146    Varicella IgG       Rubella ^ Immune  02/27/24     Hgb  11.8 g/dL 08/19/24 2146    Hct  35.3 % 08/19/24 2146    HgB A1c        1h GTT       3h GTT Fasting       3h GTT 1 hour       3h GTT 2 hour       3h GTT 3 hour        Gonorrhea (discrete)       Chlamydia (discrete)       RPR ^ Non-Reactive  02/27/24     Syphils cascade: TP-Ab (FTA)       TP-Ab       TP-Ab (EIA)       TPPA       HBsAg ^ Negative  02/27/24     Herpes Simplex Virus PCR       Herpes Simplex VIrus Culture       HIV ^ Non-Reactive  02/27/24     Hep C RNA Quant PCR       Hep C " Antibody       AFP       NIPT       Cystic Fibroisis        Group B Strep ^ Negative  24     GBS Susceptibility to Clindamycin       GBS Susceptibility to Erythromycin       Fetal Fibronectin       Genetic Testing, Maternal Blood                 Drug Screening       Test Value Date Time    Urine Drug Screen       Amphetamine Screen  Negative  24    Barbiturate Screen  Negative  24    Benzodiazepine Screen  Negative  24    Methadone Screen  Negative  24    Phencyclidine Screen  Negative  24    Opiates Screen  Negative  24    THC Screen  Negative  24    Cocaine Screen       Propoxyphene Screen       Buprenorphine Screen  Negative  24    Methamphetamine Screen       Oxycodone Screen  Negative  24    Tricyclic Antidepressants Screen  Negative  24              Legend    ^: Historical                              Past OB History:     OB History    Para Term  AB Living   5 4 4 0 0 3   SAB IAB Ectopic Molar Multiple Live Births   0 0 0 0 0 3      # Outcome Date GA Lbr Taj/2nd Weight Sex Type Anes PTL Lv   5 Current            4 Term 21 40w0d / 00:13 2560 g (5 lb 10.3 oz) M Vag-Spont EPI Y FD      Complications: History of substance abuse, Toxoplasmosis affecting pregnancy      Name: NIKOSSHERLEY BLANKENSHIP      Apgar1: 0  Apgar5: 0   3 Term 17 39w1d  3685 g (8 lb 2 oz) M Vag-Spont EPI N СЕРГЕЙ      Birth Comments: None noted at this time      Name: NIKOSGENEVA      Apgar1: 8  Apgar5: 9   2 Term 16 40w0d  2948 g (6 lb 8 oz) M Vag-Spont EPI N СЕРГЕЙ   1 Term 08 40w0d  3402 g (7 lb 8 oz) M Vag-Spont EPI N СЕРГЕЙ      Obstetric Comments   FOB 1- 1st preg and 3rd preg   FOB 2- 2nd preg   FOB 3- 4th preg   FOB 4- 5th preg       Past Medical History: Past Medical History:   Diagnosis Date    History of IUFD     term    Substance abuse 2022    sober for 2 years    Withdrawal  symptoms, drug or narcotic       Past Surgical History Past Surgical History:   Procedure Laterality Date    VAGINAL DELIVERY        Family History: History reviewed. No pertinent family history.   Social History:  reports that she has quit smoking. She has never used smokeless tobacco.   reports no history of alcohol use.   reports current drug use. Drugs: Heroin and Benzodiazepines.        Review of Systems-all negative except as noted in HPI      Objective     Vital Signs Range for the last 24 hours  Temperature: Temp:  [96.4 °F (35.8 °C)-97 °F (36.1 °C)] 96.4 °F (35.8 °C)   Temp Source: Temp src: Temporal   BP: BP: (108-120)/(57-66) 117/63   Pulse: Heart Rate:  [72-90] 83   Respirations: Resp:  [18-20] 20   Weight: Weight:  [73.9 kg (163 lb)] 73.9 kg (163 lb)     Physical Examination: General appearance - alert, well appearing, and in no distress, oriented to person, place, and time, normal appearing weight and well hydrated  Mental status - alert, oriented to person, place, and time, normal mood, behavior, speech, dress, motor activity, and thought processes, affect appropriate to mood  Neck - supple, no significant adenopathy  Chest - clear to auscultation, no wheezes, rales or rhonchi, symmetric air entry, no tachypnea, retractions or cyanosis  Heart - normal rate, regular rhythm, normal S1, S2, no murmurs, rubs, clicks or gallops  Abdomen - soft, nontender, gravid uterus, no masses or organomegaly  no rebound tenderness noted,   Pelvic - normal external genitalia, vulva, vagina, cervix, uterus and adnexa  Neurological - alert, oriented, normal speech, no focal findings or movement disorder noted  Musculoskeletal - no joint tenderness, deformity or swelling  Extremities - peripheral pulses normal, no pedal edema, no clubbing or cyanosis  Skin - normal coloration and turgor, no rashes, no suspicious skin lesions noted        Cervix: Exam by:     Dilation:     Effacement:     Station:       Laboratory Results:    Lab Results (last 24 hours)       Procedure Component Value Units Date/Time    Urine Drug Screen - Urine, Clean Catch [[id number]]  (Normal) Collected: 08/19/24 2135    Specimen: Urine, Clean Catch Updated: 08/19/24 2230     THC, Screen, Urine Negative     Phencyclidine (PCP), Urine Negative     Cocaine Screen, Urine Negative     Methamphetamine, Ur Negative     Opiate Screen Negative     Amphetamine Screen, Urine Negative     Benzodiazepine Screen, Urine Negative     Tricyclic Antidepressants Screen Negative     Methadone Screen, Urine Negative     Barbiturates Screen, Urine Negative     Oxycodone Screen, Urine Negative     Buprenorphine, Screen, Urine Negative    Narrative:      Cutoff For Drugs Screened:    Amphetamines               500 ng/ml  Barbiturates               200 ng/ml  Benzodiazepines            150 ng/ml  Cocaine                    150 ng/ml  Methadone                  200 ng/ml  Opiates                    100 ng/ml  Phencyclidine               25 ng/ml  THC                         50 ng/ml  Methamphetamine            500 ng/ml  Tricyclic Antidepressants  300 ng/ml  Oxycodone                  100 ng/ml  Buprenorphine               10 ng/ml    The normal value for all drugs tested is negative. This report includes unconfirmed screening results, with the cutoff values listed, to be used for medical treatment purposes only.  Unconfirmed results must not be used for non-medical purposes such as employment or legal testing.  Clinical consideration should be applied to any drug of abuse test, particularly when unconfirmed results are used.      Fentanyl, Urine - Urine, Clean Catch [[id number]]  (Normal) Collected: 08/19/24 2135    Specimen: Urine, Clean Catch Updated: 08/19/24 2227     Fentanyl, Urine Negative    Narrative:      Negative Threshold:      Fentanyl 5 ng/mL     The normal value for the drug tested is negative. This report includes final unconfirmed screening results to be used for medical  treatment purposes only. Unconfirmed results must not be used for non-medical purposes such as employment or legal testing. Clinical consideration should be applied to any drug of abuse test, particularly when unconfirmed results are used.           CBC & Differential [[id number]]  (Abnormal) Collected: 08/19/24 2146    Specimen: Blood Updated: 08/19/24 2218    Narrative:      The following orders were created for panel order CBC & Differential.  Procedure                               Abnormality         Status                     ---------                               -----------         ------                     CBC Auto Differential[[id number]]        Abnormal            Final result                 Please view results for these tests on the individual orders.    CBC Auto Differential [[id number]]  (Abnormal) Collected: 08/19/24 2146    Specimen: Blood Updated: 08/19/24 2218     WBC 7.45 10*3/mm3      RBC 3.65 10*6/mm3      Hemoglobin 11.8 g/dL      Hematocrit 35.3 %      MCV 96.7 fL      MCH 32.3 pg      MCHC 33.4 g/dL      RDW 13.7 %      RDW-SD 48.1 fl      MPV 11.2 fL      Platelets 129 10*3/mm3      Neutrophil % 69.9 %      Lymphocyte % 19.6 %      Monocyte % 6.2 %      Eosinophil % 0.7 %      Basophil % 0.4 %      Immature Grans % 3.2 %      Neutrophils, Absolute 5.21 10*3/mm3      Lymphocytes, Absolute 1.46 10*3/mm3      Monocytes, Absolute 0.46 10*3/mm3      Eosinophils, Absolute 0.05 10*3/mm3      Basophils, Absolute 0.03 10*3/mm3      Immature Grans, Absolute 0.24 10*3/mm3      nRBC 0.0 /100 WBC     Treponema pallidum AB w/Reflex RPR [[id number]] Collected: 08/19/24 2146    Specimen: Blood Updated: 08/19/24 2213    Rubella Antibody, IgG [[id number]] Resulted: 02/27/24    Specimen: Blood Updated: 08/19/24 2130     External Rubella Qual Immune    HIV-1 Antibody, EIA [[id number]] Resulted: 02/27/24    Specimen: Blood Updated: 08/19/24 2130     External HIV Antibody Non-Reactive    Group B Streptococcus  "Culture - Swab, Vaginal/Rectum [[id number]] Resulted: 08/09/24    Specimen: Swab from Vaginal/Rectum Updated: 08/19/24 2130     External Strep Group B Ag Negative    Hepatitis B Surface Antigen [[id number]] Resulted: 02/27/24    Specimen: Blood Updated: 08/19/24 2130     External Hepatitis B Surface Ag Negative    RPR Qualitative with Reflex to Quant [[id number]] Resulted: 02/27/24    Specimen: Blood Updated: 08/19/24 2130     External RPR Non-Reactive          Radiology Review:   Imaging Results (Last 72 Hours)       ** No results found for the last 72 hours. **              Assessment & Plan       History of IUFD      Assessment & Plan    Assessment:  1.  Intrauterine pregnancy at 37w0d weeks gestation with reassuring fetal status.    2.  induction of labor  for IUGR, h/o IUFD, maternal anxiety  with unfavorable cervix  3.  Obstetrical history significant for is noncontributory.  4.  GBS status: No results found for: \"GBSANTIGEN\"    Plan:  1. fetal and uterine monitoring  continuously and cervical ripening with  Misoprostol  2. Plan of care has been reviewed with patient   3.  Risks, benefits of treatment plan have been discussed.  4.  All questions have been answered.        Marisol Orellana,   8/20/2024  09:24 EDT    "

## 2024-08-20 NOTE — ANESTHESIA PREPROCEDURE EVALUATION
Anesthesia Evaluation     Patient summary reviewed and Nursing notes reviewed   no history of anesthetic complications:   NPO Solid Status: > 8 hours  NPO Liquid Status: > 8 hours           Airway   Mallampati: II  TM distance: >3 FB  Neck ROM: full  no difficulty expected  Dental - normal exam     Pulmonary - normal exam   (+) a smoker Former,  Cardiovascular - normal exam  Exercise tolerance: good (4-7 METS)    Rhythm: regular  Rate: normal        Neuro/Psych  GI/Hepatic/Renal/Endo    (+) hepatitis C    Musculoskeletal     Abdominal  - normal exam   Substance History   (+) drug use     OB/GYN    (+) Pregnant        Other                      Anesthesia Plan    ASA 2     epidural       Anesthetic plan, risks, benefits, and alternatives have been provided, discussed and informed consent has been obtained with: patient.    Plan discussed with CRNA.

## 2024-08-20 NOTE — PLAN OF CARE
Goal Outcome Evaluation:            Fundus firm lochia mild. Has voided since delivery. vss

## 2024-08-20 NOTE — PLAN OF CARE
Goal Outcome Evaluation:  Plan of Care Reviewed With: friend, significant other        Progress: improving  Outcome Evaluation: VSS. IV FLUIDS INFUSING. FETAL TRACING REACTIVE. PT HAVING REGULAR CONTRACTIONS, PAIN BEING MANAGED WITH PRN MEDS. PAIN MANAGEMENT OPTIONS DISCUSSED WITH PATIENT AND FAMILY. ALL QUESTIONS ANSWERED AT THIS TIME.

## 2024-08-20 NOTE — ANESTHESIA PROCEDURE NOTES
Labor Epidural    Pre-sedation assessment completed: 8/20/2024 9:24 AM    Patient reassessed immediately prior to procedure    Patient location during procedure: OB  Start Time: 8/20/2024 9:24 AM  Stop Time: 8/20/2024 9:39 AM  Indication:at surgeon's request  Performed By  Anesthesiologist: Kushal Felix MD  Preanesthetic Checklist  Completed: patient identified, IV checked, site marked, risks and benefits discussed, surgical consent, monitors and equipment checked, pre-op evaluation and timeout performed  Prep:  Pt Position:sitting  Sterile Tech:gloves, mask, sterile barrier and cap  Prep:povidone-iodine 7.5% surgical scrub  Monitoring:blood pressure monitoring  Epidural Block Procedure:  Approach:midline  Guidance:landmark technique and palpation technique  Location:L3-L4  Needle Type:Tuohy  Needle Gauge:17 G  Loss of Resistance Medium: air  Loss of Resistance: 7cm  Cath Depth at skin:10 cm  Paresthesia: none  Aspiration:negative  Test Dose:negative  Number of Attempts: 1  Post Assessment:  Dressing:occlusive dressing applied and secured with tape  Pt Tolerance:patient tolerated the procedure well with no apparent complications  Complications:no

## 2024-08-20 NOTE — PLAN OF CARE
Problem: Adult Inpatient Plan of Care  Goal: Plan of Care Review  Outcome: Ongoing, Progressing   Goal Outcome Evaluation:                   Tolerated labor well, no complaints at this time, will cont to monitor pp

## 2024-08-20 NOTE — NON STRESS TEST
Anna Calix, a  at 37w0d with an AG of 9/10/2024, Date entered prior to episode creation, was seen at [facility] for a nonstress test.    Chief Complaint   Patient presents with    Scheduled Induction     HX OF IUFD       Patient Active Problem List   Diagnosis    Substance abuse affecting pregnancy, antepartum     (spontaneous vaginal delivery)    Opioid use disorder, severe, dependence    Severe benzodiazepine use disorder    Methamphetamine use disorder, severe    IUGR (intrauterine growth restriction) affecting care of mother, third trimester, fetus 1    History of IUFD       Start Time: 725  Stop Time: 745    Interpretation A  Nonstress Test Interpretation A: Reactive  Comments A: verified with Beatriz GONZALEZ RN

## 2024-08-20 NOTE — L&D DELIVERY NOTE
Vaginal Delivery Procedure Note    Anna Calix  Gestational Age-37.0 weeks        OBGYN: Marisol Orellana DO      Pre-op Diagnosis:   Pt 35 y/o  @ 37.0 weeks for IOL due to severe IUGR and h/o IUFD, maternal anxiety      Anesthesia: Epidural        Detailed Description of Procedure     The patient was prepped and draped in normal sterile fashion. The head was delivered without difficulty. There was no nuchal cord. Anterior and posterior shoulders delivered without any problems. The rest of the infant was delivered in controlled fashion.The infant was bulb suctioned at delivery. The placenta delivered intact. The patient tolerated the procedure well and went to the recovery room in stable condition.        Time of delivery: 1235  Maternal Blood Type: AB Positive  Fetal Gender: Female  Nuchal Cord: No  Tears: None   Blood Cord: Yes  Estimated Blood Loss: 100cc  Placenta: Spontaneous, Delivered Intact   APGARS:           Disposition: Transfer to Women's Health Floor  Condition: Stable    Marisol Orellana DO     Date: 2024  Time: 12:42 EDT

## 2024-08-21 LAB
BASOPHILS # BLD AUTO: 0.04 10*3/MM3 (ref 0–0.2)
BASOPHILS NFR BLD AUTO: 0.4 % (ref 0–1.5)
DEPRECATED RDW RBC AUTO: 48.7 FL (ref 37–54)
EOSINOPHIL # BLD AUTO: 0.07 10*3/MM3 (ref 0–0.4)
EOSINOPHIL NFR BLD AUTO: 0.7 % (ref 0.3–6.2)
ERYTHROCYTE [DISTWIDTH] IN BLOOD BY AUTOMATED COUNT: 13.9 % (ref 12.3–15.4)
HCT VFR BLD AUTO: 35.5 % (ref 34–46.6)
HGB BLD-MCNC: 11.9 G/DL (ref 12–15.9)
IMM GRANULOCYTES # BLD AUTO: 0.14 10*3/MM3 (ref 0–0.05)
IMM GRANULOCYTES NFR BLD AUTO: 1.5 % (ref 0–0.5)
LYMPHOCYTES # BLD AUTO: 1.41 10*3/MM3 (ref 0.7–3.1)
LYMPHOCYTES NFR BLD AUTO: 14.7 % (ref 19.6–45.3)
MCH RBC QN AUTO: 32.2 PG (ref 26.6–33)
MCHC RBC AUTO-ENTMCNC: 33.5 G/DL (ref 31.5–35.7)
MCV RBC AUTO: 95.9 FL (ref 79–97)
MONOCYTES # BLD AUTO: 0.52 10*3/MM3 (ref 0.1–0.9)
MONOCYTES NFR BLD AUTO: 5.4 % (ref 5–12)
NEUTROPHILS NFR BLD AUTO: 7.43 10*3/MM3 (ref 1.7–7)
NEUTROPHILS NFR BLD AUTO: 77.3 % (ref 42.7–76)
NRBC BLD AUTO-RTO: 0 /100 WBC (ref 0–0.2)
PLATELET # BLD AUTO: 135 10*3/MM3 (ref 140–450)
PMV BLD AUTO: 10.9 FL (ref 6–12)
RBC # BLD AUTO: 3.7 10*6/MM3 (ref 3.77–5.28)
WBC NRBC COR # BLD AUTO: 9.61 10*3/MM3 (ref 3.4–10.8)

## 2024-08-21 PROCEDURE — 85025 COMPLETE CBC W/AUTO DIFF WBC: CPT | Performed by: OBSTETRICS & GYNECOLOGY

## 2024-08-21 RX ADMIN — IBUPROFEN 800 MG: 800 TABLET, FILM COATED ORAL at 14:51

## 2024-08-21 RX ADMIN — DOCUSATE SODIUM 100 MG: 100 CAPSULE, LIQUID FILLED ORAL at 21:53

## 2024-08-21 RX ADMIN — DOCUSATE SODIUM 100 MG: 100 CAPSULE, LIQUID FILLED ORAL at 10:07

## 2024-08-21 RX ADMIN — HYDROCODONE BITARTRATE AND ACETAMINOPHEN 1 TABLET: 5; 325 TABLET ORAL at 01:57

## 2024-08-21 RX ADMIN — IBUPROFEN 800 MG: 800 TABLET, FILM COATED ORAL at 21:53

## 2024-08-21 RX ADMIN — IBUPROFEN 800 MG: 800 TABLET, FILM COATED ORAL at 10:07

## 2024-08-21 RX ADMIN — PRENATAL VITAMINS-IRON FUMARATE 27 MG IRON-FOLIC ACID 0.8 MG TABLET 1 TABLET: at 10:07

## 2024-08-21 NOTE — PLAN OF CARE
Problem: Adult Inpatient Plan of Care  Goal: Plan of Care Review  Outcome: Ongoing, Progressing  Flowsheets (Taken 8/21/2024 0323)  Progress: improving  Plan of Care Reviewed With: patient  Outcome Evaluation: vitals and bleeding wnl, fundus firm  Goal: Patient-Specific Goal (Individualized)  Outcome: Ongoing, Progressing  Goal: Absence of Hospital-Acquired Illness or Injury  Outcome: Ongoing, Progressing  Intervention: Identify and Manage Fall Risk  Description: Perform standard risk assessment on admission using a validated tool or comprehensive approach appropriate to the patient; reassess fall risk frequently, with change in status or transfer to another level of care.  Communicate fall injury risk to interprofessional healthcare team.  Determine need for increased observation, equipment and environmental modification, such as low bed, signage and supportive, nonskid footwear.  Adjust safety measures to individual developmental age, stage and identified risk factors.  Reinforce the importance of safety and physical activity with patient and family.  Perform regular intentional rounding to assess need for position change, pain assessment and personal needs, including assistance with toileting.  Flowsheets (Taken 8/20/2024 2000)  Safety Promotion/Fall Prevention: safety round/check completed  Intervention: Prevent Skin Injury  Description: Perform a screening for skin injury risk, such as pressure or moisture associated skin damage on admission and at regular intervals throughout hospital stay.  Keep all areas of skin (especially folds) clean and dry.  Maintain adequate skin hydration.  Relieve and redistribute pressure and protect bony prominences; implement measures based on patient-specific risk factors.  Match turning and repositioning schedule to clinical condition.  Encourage weight shift frequently; assist with reposition if unable to complete independently.  Float heels off bed; avoid pressure on the  Achilles tendon.  Keep skin free from extended contact with medical devices.  Encourage functional activity and mobility, as early as tolerated.  Use aids (e.g., slide boards, mechanical lift) during transfer.  Flowsheets (Taken 8/20/2024 1500 by Lana Marie, RN)  Body Position: position changed independently  Intervention: Prevent and Manage VTE (Venous Thromboembolism) Risk  Description: Assess for VTE (venous thromboembolism) risk.  Encourage and assist with early ambulation.  Initiate and maintain compression or other therapy, as indicated, based on identified risk in accordance with organizational protocol and provider order.  Encourage both active and passive leg exercises while in bed, if unable to ambulate.  Flowsheets (Taken 8/20/2024 2000)  Activity Management: up ad olga  Intervention: Prevent Infection  Description: Maintain skin and mucous membrane integrity; promote hand, oral and pulmonary hygiene.  Optimize fluid balance, nutrition, sleep and glycemic control to maximize infection resistance.  Identify potential sources of infection early to prevent or mitigate progression of infection (e.g., wound, lines, devices).  Evaluate ongoing need for invasive devices; remove promptly when no longer indicated.  Flowsheets (Taken 8/20/2024 1500 by Lana Marie, RN)  Infection Prevention: rest/sleep promoted  Goal: Optimal Comfort and Wellbeing  Outcome: Ongoing, Progressing  Intervention: Monitor Pain and Promote Comfort  Description: Assess pain level, treatment efficacy and patient response at regular intervals using a consistent pain scale.  Consider the presence and impact of preexisting chronic pain.  Encourage patient and caregiver involvement in pain assessment, interventions and safety measures.  Flowsheets (Taken 8/21/2024 0157)  Pain Management Interventions: see MAR  Intervention: Provide Person-Centered Care  Description: Use a family-focused approach to care.  Develop trust and rapport by  proactively providing information, encouraging questions, addressing concerns and offering reassurance.  Acknowledge emotional response to hospitalization.  Recognize and utilize personal coping strategies.  Honor spiritual and cultural preferences.  Flowsheets (Taken 8/20/2024 2000)  Trust Relationship/Rapport:   care explained   choices provided  Goal: Readiness for Transition of Care  Outcome: Ongoing, Progressing  Intervention: Mutually Develop Transition Plan  Description: Identify available resources for support (e.g., family, friends, community).  Identify and address barriers to ongoing treatment and home management (e.g., environmental, financial).  Provide opportunities to practice self-management skills.  Assess and monitor emotional readiness for transition.  Establish or reconnect linkage with outpatient providers or community-based services.  Flowsheets  Taken 8/21/2024 0323 by Nel Garner RN  Equipment Currently Used at Home: none  Concerns to be Addressed: no discharge needs identified  Readmission Within the Last 30 Days: no previous admission in last 30 days  Patient/Family Anticipated Services at Transition: none  Taken 8/19/2024 2126 by Marie Stevenson RN  Transportation Anticipated: family or friend will provide     Problem: Fall Injury Risk  Goal: Absence of Fall and Fall-Related Injury  Outcome: Ongoing, Progressing  Intervention: Identify and Manage Contributors  Description: Develop a fall prevention plan with the patient and caregiver/family.  Provide reorientation, appropriate sensory stimulation and routines with changes in mental status to decrease risk of fall.  Promote use of personal vision and auditory aids.  Assess assistance level required for safe and effective self-care; provide support as needed, such as toileting, mobilization. For age 65 and older, implement timed toileting with assistance.  Encourage physical activity, such as performance of mobility and self-care at  highest level of patient ability, multicomponent exercise program and provision of appropriate assistive devices.  If fall occurs, assess the severity of injury; implement fall injury protocol. Determine the cause and revise fall injury prevention plan.  Regularly review medication contribution to fall risk; adjust medication administration times to minimize risk of falling.  Consider risk related to polypharmacy and age.  Balance adequate pain management with potential for oversedation.  Flowsheets (Taken 8/20/2024 2000)  Medication Review/Management: medications reviewed  Intervention: Promote Injury-Free Environment  Description: Provide a safe, barrier-free environment that encourages independent activity.  Keep care area uncluttered and well-lighted.  Determine need for increased observation or monitoring.  Avoid use of devices that minimize mobility, such as restraints or indwelling urinary catheter.  Flowsheets (Taken 8/20/2024 2000)  Safety Promotion/Fall Prevention: safety round/check completed     Problem: Adjustment to Role Transition (Postpartum Vaginal Delivery)  Goal: Successful Maternal Role Transition  Outcome: Ongoing, Progressing     Problem: Bleeding (Postpartum Vaginal Delivery)  Goal: Hemostasis  Outcome: Ongoing, Progressing     Problem: Infection (Postpartum Vaginal Delivery)  Goal: Absence of Infection Signs/Symptoms  Outcome: Ongoing, Progressing  Intervention: Prevent or Manage Infection  Description: Encourage perineal care; if present, monitor episiotomy site for swelling, redness and drainage.  Implement transmission-based precautions and isolation, as indicated, to prevent spread of infection.  Obtain cultures prior to initiating antimicrobial therapy. Do not delay treatment for laboratory results with presence of high suspicion. Note: If endometritis is suspected, treatment may be initiated without obtaining cultures.  Administer ordered antimicrobial therapy promptly; reassess need  regularly.  Identify and manage signs of early sepsis, such as increased heart rate and decreased blood pressure, as well as changes in mental state, respiratory pattern or peripheral perfusion.  If perineal wound infection is identified, anticipate need for suture removal, debridement and cleansing.  Notify infant’s care provider of maternal infection.  Flowsheets (Taken 8/20/2024 1500 by Lana Marie RN)  Perineal Care:   perineum cleansed   medicated pads applied     Problem: Pain (Postpartum Vaginal Delivery)  Goal: Acceptable Pain Control  Outcome: Ongoing, Progressing  Intervention: Prevent or Manage Pain  Description: Determine pain management plan with patient and caregiver; review plan regularly.  Use a consistent, validated tool for pain assessment; evaluate pain level, effect of treatment and patient’s response at regular intervals.  Monitor perineal condition; note presence of hematoma, hemorrhoids and episiotomy appearance (if applicable).  Use cold application, as culturally-appropriate, to the perineal area for the first 24 to 48 hours following delivery for comfort.  Verify correct infant latch when breastfeeding to prevent nipple pain.  Consider the presence and impact of preexisting chronic pain.  Encourage patient and caregiver involvement in pain assessment, interventions and safety measures.  Individualize pharmacologic pain management plan; titrate medication to patient response.  Monitor and address medication-induced side effects, such as constipation, nausea, vomiting.  Initiate individualized nonpharmacologic pain management measures.  Consider and address emotional response to pain.  If engorgement occurs, encourage more frequent breastfeeding or pumping and storing additional milk to ease discomfort.  Note: Cold compresses, as culturally-appropriate, may be used if bottle-feeding.  If post-dural puncture headache identified, encourage adequate hydration and anticipate the need for  epidural blood patch.  If hemorrhoids are present and painful, offer topical pain relief and sitz baths for comfort.  Flowsheets (Taken 8/21/2024 0157)  Pain Management Interventions: see MAR     Problem: Urinary Retention (Postpartum Vaginal Delivery)  Goal: Effective Urinary Elimination  Outcome: Ongoing, Progressing  Intervention: Promote Effective Urinary Elimination  Description: Monitor fluid balance to promote optimal hydration.  Assess for bladder distension; encourage voiding within 6 hours following delivery and regularly thereafter.  Encourage early ambulation when able.  Provide safe and ready access to toileting devices and aids (e.g., commode, urinal).  Promote behavioral methods to enhance voiding ability (e.g., utilize relaxation techniques, mutually establish a regular elimination schedule, allow time for bladder emptying, position to optimize flow; double voiding).  Implement methods to facilitate elimination (e.g., running water, warm water over perineum, sitz bath, privacy).  Utilize portable bladder ultrasound to assess pre- and postvoid volumes.  Consider potential contributing factors (e.g., perineal trauma, lack of privacy, medication, immobility, constipation).  Anticipate the need for intermittent catheterization; advocate for early removal of indwelling device.  Facilitate urogenital hygiene to maintain perineal skin integrity.  Flowsheets (Taken 8/21/2024 5445)  Urinary Elimination Promotion: frequent voiding encouraged   Goal Outcome Evaluation:  Plan of Care Reviewed With: patient        Progress: improving  Outcome Evaluation: vitals and bleeding wnl, fundus firm

## 2024-08-21 NOTE — PAYOR COMM NOTE
"Anna Knott (34 y.o. Female)       Date of Birth   1990    Social Security Number       Address   [address] [id number]    Home Phone   [phone]    MRN   [id number]       Denominational   None    Marital Status   Single                            Admission Date   24    Admission Type   Elective    Admitting Provider   Marisol Orellana DO    Attending Provider   Marisol Orellana DO    Department, Room/Bed   [facility], W238/1       Discharge Date       Discharge Disposition       Discharge Destination                                 Attending Provider: Marisol Orellana DO    Allergies: Penicillins    Isolation: None   Infection: None   Code Status: CPR    Ht: 154.9 cm (61\")   Wt: 73.9 kg (163 lb)    Admission Cmt: None   Principal Problem: History of IUFD [Z87.59]                   Active Insurance as of 2024       Primary Coverage       Payor Plan Insurance Group Employer/Plan Group    ANTHEM MEDICAID [place] MEDICAID [id number]       Payor Plan Address Payor Plan Phone Number Payor Plan Fax Number Effective Dates    [address] [address] [phone]  3/1/2016 - None Entered    [facility] [id number]         Subscriber Name Subscriber Birth Date Member ID       ANNA KNOTT 1990 [id number]                     Emergency Contacts        (Rel.) Home Phone Work Phone Mobile Phone    Yehuda Beard (Spouse) -- -- [phone]    DesmondDelphine richardson (Friend) -- -- [phone]                 History & Physical        Marisol Orellana DO at 24 [address] [place] [address]  Obstetric History and Physical    Chief Complaint   Patient presents with    Scheduled Induction     HX OF IUFD       Subjective    Patient is a 34 y.o. female  currently at 37w0d, who presents with severe IUGR for IOL, h/o IUFD, maternal anxiety.    Her prenatal care is complicated by  abnormal fetal growth  IUGR.  Her previous " obstetric/gynecological history is noted for is non-contributory.    The following portions of the patient's history were reviewed and updated as appropriate: current medications, allergies, past medical history, past surgical history, past family history, past social history, and problem list .   Social History     Socioeconomic History    Marital status: Single   Tobacco Use    Smoking status: Former    Smokeless tobacco: Never    Tobacco comments:     Quit smoking Caden 3, 2022   Vaping Use    Vaping status: Former    Substances: Nicotine, Flavoring    Devices: Disposable   Substance and Sexual Activity    Alcohol use: No    Drug use: Yes     Types: Heroin, Benzodiazepines     Comment: percocets, subutex 6yrs ago; sober date Jan 2022    Sexual activity: Yes     Partners: Male     Birth control/protection: None     Past Medical History:   Diagnosis Date    History of IUFD 2020    term    Substance abuse 01/03/2022    sober for 2 years    Withdrawal symptoms, drug or narcotic        Current Facility-Administered Medications:     acetaminophen (TYLENOL) tablet 650 mg, 650 mg, Oral, Q4H PRN, Marisol Orellana DO    butorphanol (STADOL) injection 1 mg, 1 mg, Intravenous, Q2H PRN, Marisol Orellana DO    butorphanol (STADOL) injection 2 mg, 2 mg, Intravenous, Q3H PRN, Marisol Orellana DO, 2 mg at 08/20/24 0504    ePHEDrine Sulfate (Pressors) 5 MG/ML injection 10 mg, 10 mg, Intravenous, Q10 Min PRN, Kushal Felix MD    famotidine (PEPCID) injection 20 mg, 20 mg, Intravenous, Once PRN, Kushal Felix MD    lactated ringers bolus 1,000 mL, 1,000 mL, Intravenous, Once PRN, Marisol Orellana DO    lactated ringers bolus 1,000 mL, 1,000 mL, Intravenous, Once, Kushal Felix MD    lactated ringers infusion, 125 mL/hr, Intravenous, Continuous, Marisol Orellana DO, Last Rate: 125 mL/hr at 08/20/24 0506, 125 mL/hr at 08/20/24 0506    miSOPROStol (CYTOTEC) split tablet 25 mcg,  25 mcg, Vaginal, Q4H PRN, Marisol Orellana DO    ondansetron ODT (ZOFRAN-ODT) disintegrating tablet 4 mg, 4 mg, Oral, Q6H PRN **OR** ondansetron (ZOFRAN) injection 4 mg, 4 mg, Intravenous, Q6H PRN, Marisol Orellana DO    ondansetron (ZOFRAN) injection 4 mg, 4 mg, Intravenous, Once PRN, Kushal Felix MD    oxytocin (PITOCIN) 30 units in 0.9% sodium chloride 500 mL (premix), 2-20 kacey-units/min, Intravenous, Titrated, Marisol Orellana DO, Last Rate: 6 mL/hr at 08/20/24 0915, 6 kacey-units/min at 08/20/24 0915    [START ON 8/21/2024] prenatal vitamin tablet 1 tablet, 1 tablet, Oral, Daily, Marisol Orellana DO    ropivacaine (NAROPIN) 0.2 % injection, 14 mL/hr, Epidural, Continuous, Kushal Felix MD    sodium chloride (NS) irrigation solution 1,000 mL, 1,000 mL, Irrigation, Once PRN, Marisol Orellana DO    sodium chloride 0.9 % flush 10 mL, 10 mL, Intravenous, PRN, Marisol Orellana DO    sodium chloride 0.9 % infusion 40 mL, 40 mL, Intravenous, PRN, Marisol Orellana DO    terbutaline (BRETHINE) injection 0.2 mg, 0.2 mg, Subcutaneous, PRN, Marisol Orellana DO  Allergies   Allergen Reactions    Penicillins Hives     Past Surgical History:   Procedure Laterality Date    VAGINAL DELIVERY           Prenatal Information:   Maternal Prenatal Labs  Blood Type ABO Type   Date Value Ref Range Status   08/19/2024 AB  Final      Rh Status RH type   Date Value Ref Range Status   08/19/2024 Positive  Final      Antibody Screen Antibody Screen   Date Value Ref Range Status   08/19/2024 Negative  Final      Rapid Urine Drug Screen Barbiturates Screen, Urine   Date Value Ref Range Status   08/19/2024 Negative Negative Final     Benzodiazepine Screen, Urine   Date Value Ref Range Status   08/19/2024 Negative Negative Final     Methadone Screen, Urine   Date Value Ref Range Status   08/19/2024 Negative Negative Final     Opiate Screen   Date Value Ref Range  "Status   08/19/2024 Negative Negative Final     THC, Screen, Urine   Date Value Ref Range Status   08/19/2024 Negative Negative Final     Cocaine Screen, Urine   Date Value Ref Range Status   08/19/2024 Negative Negative Final     Amphetamine Screen, Urine   Date Value Ref Range Status   08/19/2024 Negative Negative Final     Buprenorphine, Screen, Urine   Date Value Ref Range Status   08/19/2024 Negative Negative Final     Methamphetamine, Ur   Date Value Ref Range Status   08/19/2024 Negative Negative Final     Oxycodone Screen, Urine   Date Value Ref Range Status   08/19/2024 Negative Negative Final     Tricyclic Antidepressants Screen   Date Value Ref Range Status   08/19/2024 Negative Negative Final      Group B Strep Culture No results found for: \"GBSANTIGEN\"           External Prenatal Results       Pregnancy Outside Results - Transcribed From Office Records - See Scanned Records For Details       Test Value Date Time    ABO  AB  08/19/24 2146    Rh  Positive  08/19/24 2146    Antibody Screen  Negative  08/19/24 2146    Varicella IgG       Rubella ^ Immune  02/27/24     Hgb  11.8 g/dL 08/19/24 2146    Hct  35.3 % 08/19/24 2146    HgB A1c        1h GTT       3h GTT Fasting       3h GTT 1 hour       3h GTT 2 hour       3h GTT 3 hour        Gonorrhea (discrete)       Chlamydia (discrete)       RPR ^ Non-Reactive  02/27/24     Syphils cascade: TP-Ab (FTA)       TP-Ab       TP-Ab (EIA)       TPPA       HBsAg ^ Negative  02/27/24     Herpes Simplex Virus PCR       Herpes Simplex VIrus Culture       HIV ^ Non-Reactive  02/27/24     Hep C RNA Quant PCR       Hep C Antibody       AFP       NIPT       Cystic Fibroisis        Group B Strep ^ Negative  08/09/24     GBS Susceptibility to Clindamycin       GBS Susceptibility to Erythromycin       Fetal Fibronectin       Genetic Testing, Maternal Blood                 Drug Screening       Test Value Date Time    Urine Drug Screen       Amphetamine Screen  Negative  08/19/24 "     Barbiturate Screen  Negative  24    Benzodiazepine Screen  Negative  24    Methadone Screen  Negative  24    Phencyclidine Screen  Negative  24    Opiates Screen  Negative  24    THC Screen  Negative  24    Cocaine Screen       Propoxyphene Screen       Buprenorphine Screen  Negative  24    Methamphetamine Screen       Oxycodone Screen  Negative  24    Tricyclic Antidepressants Screen  Negative  24              Legend    ^: Historical                              Past OB History:     OB History    Para Term  AB Living   5 4 4 0 0 3   SAB IAB Ectopic Molar Multiple Live Births   0 0 0 0 0 3      # Outcome Date GA Lbr Taj/2nd Weight Sex Type Anes PTL Lv   5 Current            4 Term 21 40w0d / 00:13 2560 g (5 lb 10.3 oz) M Vag-Spont EPI Y FD      Complications: History of substance abuse, Toxoplasmosis affecting pregnancy      Name: SHERLEY KNOTT FD      Apgar1: 0  Apgar5: 0   3 Term 17 39w1d  3685 g (8 lb 2 oz) M Vag-Spont EPI N СЕРГЕЙ      Birth Comments: None noted at this time      Name: GENEVA KNOTT      Apgar1: 8  Apgar5: 9   2 Term 16 40w0d  2948 g (6 lb 8 oz) M Vag-Spont EPI N СЕРГЕЙ   1 Term 08 40w0d  3402 g (7 lb 8 oz) M Vag-Spont EPI N СЕРГЕЙ      Obstetric Comments   FOB 1- 1st preg and 3rd preg   FOB 2- 2nd preg   FOB 3- 4th preg   FOB 4- 5th preg       Past Medical History: Past Medical History:   Diagnosis Date    History of IUFD     term    Substance abuse 2022    sober for 2 years    Withdrawal symptoms, drug or narcotic       Past Surgical History Past Surgical History:   Procedure Laterality Date    VAGINAL DELIVERY        Family History: History reviewed. No pertinent family history.   Social History:  reports that she has quit smoking. She has never used smokeless tobacco.   reports no history of alcohol use.   reports current drug use. Drugs:  Heroin and Benzodiazepines.        Review of Systems-all negative except as noted in HPI      Objective    Vital Signs Range for the last 24 hours  Temperature: Temp:  [96.4 °F (35.8 °C)-97 °F (36.1 °C)] 96.4 °F (35.8 °C)   Temp Source: Temp src: Temporal   BP: BP: (108-120)/(57-66) 117/63   Pulse: Heart Rate:  [72-90] 83   Respirations: Resp:  [18-20] 20   Weight: Weight:  [73.9 kg (163 lb)] 73.9 kg (163 lb)     Physical Examination: General appearance - alert, well appearing, and in no distress, oriented to person, place, and time, normal appearing weight and well hydrated  Mental status - alert, oriented to person, place, and time, normal mood, behavior, speech, dress, motor activity, and thought processes, affect appropriate to mood  Neck - supple, no significant adenopathy  Chest - clear to auscultation, no wheezes, rales or rhonchi, symmetric air entry, no tachypnea, retractions or cyanosis  Heart - normal rate, regular rhythm, normal S1, S2, no murmurs, rubs, clicks or gallops  Abdomen - soft, nontender, gravid uterus, no masses or organomegaly  no rebound tenderness noted,   Pelvic - normal external genitalia, vulva, vagina, cervix, uterus and adnexa  Neurological - alert, oriented, normal speech, no focal findings or movement disorder noted  Musculoskeletal - no joint tenderness, deformity or swelling  Extremities - peripheral pulses normal, no pedal edema, no clubbing or cyanosis  Skin - normal coloration and turgor, no rashes, no suspicious skin lesions noted        Cervix: Exam by:     Dilation:     Effacement:     Station:       Laboratory Results:   Lab Results (last 24 hours)       Procedure Component Value Units Date/Time    Urine Drug Screen - Urine, Clean Catch [[id number]]  (Normal) Collected: 08/19/24 2135    Specimen: Urine, Clean Catch Updated: 08/19/24 2230     THC, Screen, Urine Negative     Phencyclidine (PCP), Urine Negative     Cocaine Screen, Urine Negative     Methamphetamine, Ur Negative      Opiate Screen Negative     Amphetamine Screen, Urine Negative     Benzodiazepine Screen, Urine Negative     Tricyclic Antidepressants Screen Negative     Methadone Screen, Urine Negative     Barbiturates Screen, Urine Negative     Oxycodone Screen, Urine Negative     Buprenorphine, Screen, Urine Negative    Narrative:      Cutoff For Drugs Screened:    Amphetamines               500 ng/ml  Barbiturates               200 ng/ml  Benzodiazepines            150 ng/ml  Cocaine                    150 ng/ml  Methadone                  200 ng/ml  Opiates                    100 ng/ml  Phencyclidine               25 ng/ml  THC                         50 ng/ml  Methamphetamine            500 ng/ml  Tricyclic Antidepressants  300 ng/ml  Oxycodone                  100 ng/ml  Buprenorphine               10 ng/ml    The normal value for all drugs tested is negative. This report includes unconfirmed screening results, with the cutoff values listed, to be used for medical treatment purposes only.  Unconfirmed results must not be used for non-medical purposes such as employment or legal testing.  Clinical consideration should be applied to any drug of abuse test, particularly when unconfirmed results are used.      Fentanyl, Urine - Urine, Clean Catch [[id number]]  (Normal) Collected: 08/19/24 2135    Specimen: Urine, Clean Catch Updated: 08/19/24 2227     Fentanyl, Urine Negative    Narrative:      Negative Threshold:      Fentanyl 5 ng/mL     The normal value for the drug tested is negative. This report includes final unconfirmed screening results to be used for medical treatment purposes only. Unconfirmed results must not be used for non-medical purposes such as employment or legal testing. Clinical consideration should be applied to any drug of abuse test, particularly when unconfirmed results are used.           CBC & Differential [[id number]]  (Abnormal) Collected: 08/19/24 2146    Specimen: Blood Updated: 08/19/24 2218     Narrative:      The following orders were created for panel order CBC & Differential.  Procedure                               Abnormality         Status                     ---------                               -----------         ------                     CBC Auto Differential[[id number]]        Abnormal            Final result                 Please view results for these tests on the individual orders.    CBC Auto Differential [[id number]]  (Abnormal) Collected: 08/19/24 2146    Specimen: Blood Updated: 08/19/24 2218     WBC 7.45 10*3/mm3      RBC 3.65 10*6/mm3      Hemoglobin 11.8 g/dL      Hematocrit 35.3 %      MCV 96.7 fL      MCH 32.3 pg      MCHC 33.4 g/dL      RDW 13.7 %      RDW-SD 48.1 fl      MPV 11.2 fL      Platelets 129 10*3/mm3      Neutrophil % 69.9 %      Lymphocyte % 19.6 %      Monocyte % 6.2 %      Eosinophil % 0.7 %      Basophil % 0.4 %      Immature Grans % 3.2 %      Neutrophils, Absolute 5.21 10*3/mm3      Lymphocytes, Absolute 1.46 10*3/mm3      Monocytes, Absolute 0.46 10*3/mm3      Eosinophils, Absolute 0.05 10*3/mm3      Basophils, Absolute 0.03 10*3/mm3      Immature Grans, Absolute 0.24 10*3/mm3      nRBC 0.0 /100 WBC     Treponema pallidum AB w/Reflex RPR [[id number]] Collected: 08/19/24 2146    Specimen: Blood Updated: 08/19/24 2213    Rubella Antibody, IgG [[id number]] Resulted: 02/27/24    Specimen: Blood Updated: 08/19/24 2130     External Rubella Qual Immune    HIV-1 Antibody, EIA [[id number]] Resulted: 02/27/24    Specimen: Blood Updated: 08/19/24 2130     External HIV Antibody Non-Reactive    Group B Streptococcus Culture - Swab, Vaginal/Rectum [[id number]] Resulted: 08/09/24    Specimen: Swab from Vaginal/Rectum Updated: 08/19/24 2130     External Strep Group B Ag Negative    Hepatitis B Surface Antigen [[id number]] Resulted: 02/27/24    Specimen: Blood Updated: 08/19/24 2130     External Hepatitis B Surface Ag Negative    RPR Qualitative with Reflex to Quant [[id number]]  "Resulted: 02/27/24    Specimen: Blood Updated: 08/19/24 2130     External RPR Non-Reactive          Radiology Review:   Imaging Results (Last 72 Hours)       ** No results found for the last 72 hours. **              Assessment & Plan      History of IUFD      Assessment & Plan    Assessment:  1.  Intrauterine pregnancy at 37w0d weeks gestation with reassuring fetal status.    2.  induction of labor  for IUGR, h/o IUFD, maternal anxiety  with unfavorable cervix  3.  Obstetrical history significant for is noncontributory.  4.  GBS status: No results found for: \"GBSANTIGEN\"    Plan:  1. fetal and uterine monitoring  continuously and cervical ripening with  Misoprostol  2. Plan of care has been reviewed with patient   3.  Risks, benefits of treatment plan have been discussed.  4.  All questions have been answered.        Marisol Orellana DO  8/20/2024  09:24 EDT      Electronically signed by Marisol Orellana DO at 08/20/24 0925       H&P signed by [employer], [employer] at 08/20/24 0810         [Media Unavailable] Scan on 8/20/2024 0803 by [employer], [facility]: OB [facility]AL H&P, [facility], 08/19/2024          Electronically signed by [employer], [facility] at 08/20/24 0810       Orders (all)        Start     Ordered    08/21/24 0900  prenatal vitamin tablet 1 tablet  Daily,   Status:  Discontinued         08/20/24 0034    08/21/24 0900  docusate sodium (COLACE) capsule 100 mg  2 Times Daily         08/20/24 1523    08/21/24 0900  prenatal vitamin tablet 1 tablet  Daily         08/20/24 1523    08/21/24 0800  Sitz Bath  3 Times Daily        Comments: PRN    08/20/24 1523    08/21/24 0600  CBC & Differential  Timed        Comments: Postpartum Day 1      08/20/24 1523    08/21/24 0600  CBC Auto Differential  PROCEDURE ONCE        Comments: Postpartum Day 1      08/20/24 2202    08/21/24 0000  bisacodyl (DULCOLAX) suppository 10 mg  Daily PRN         08/20/24 1523    08/20/24 1843  k pad module  Once  "        08/20/24 1843    08/20/24 1615  ibuprofen (ADVIL,MOTRIN) tablet 800 mg  3 times daily         08/20/24 1523    08/20/24 1524  Transfer Patient  Once         08/20/24 1523    08/20/24 1524  Code Status and Medical Interventions: CPR (Attempt to Resuscitate); Full  Continuous         08/20/24 1523    08/20/24 1524  Vital Signs Per Hospital Policy  Per Hospital Policy         08/20/24 1523    08/20/24 1524  Notify Physician  Until Discontinued         08/20/24 1523    08/20/24 1524  Up Ad Trudy  Until Discontinued         08/20/24 1523    08/20/24 1524  Diet: Regular/House; Fluid Consistency: Thin (IDDSI 0)  Diet Effective Now         08/20/24 1523    08/20/24 1524  Fundal and Lochia Check  Per Hospital Policy        Comments: Q 15 min x 4, Q 30 min x 2, then Q Shift    08/20/24 1523    08/20/24 1524  RN to Assess Rh Status & Place RhIG Evaluation Order if Indicated  Continuous         08/20/24 1523    08/20/24 1524  Bladder Assessment  Per Order Details        Comments: Postpartum 1) Upon Admission to Unit & Every 4 Hours PRN Until Voiding. 2) Out of Bed to Void in 8 Hours.    08/20/24 1523    08/20/24 1524  Straight Cath  Per Order Details        Comments: Postpartum: If Distended & Unable to Void, May Repeat Once.    08/20/24 1523    08/20/24 1524  Indwelling Urinary Catheter  Per Order Details        Comments: Postpartum : After Straight Cathed x2 or if Greater Than 1000mL Residual, Insert Indwelling Urinary Catheter Until Further MD Order.    08/20/24 1523    08/20/24 1524  Breast pump to bed  Once         08/20/24 1523    08/20/24 1524  If indicated -- Please administer RH Immunoglobulin based on results of cord blood evaluation and fetal screen lab tests, pharmacy to dispense  Continuous        Comments: See process instructions for reference range details.    08/20/24 1523    08/20/24 1523  sodium chloride 0.9 % flush 1-10 mL  As Needed         08/20/24 1523    08/20/24 1523  oxytocin (PITOCIN) 30 units in  "0.9% sodium chloride 500 mL (premix)  Continuous PRN         08/20/24 1523    08/20/24 1523  acetaminophen (TYLENOL) tablet 650 mg  Every 6 Hours PRN         08/20/24 1523    08/20/24 1523  HYDROcodone-acetaminophen (NORCO) 5-325 MG per tablet 1 tablet  Every 4 Hours PRN        Placed in \"Or\" Linked Group    08/20/24 1523    08/20/24 1523  HYDROcodone-acetaminophen (NORCO)  MG per tablet 1 tablet  Every 4 Hours PRN        Placed in \"Or\" Linked Group    08/20/24 1523    08/20/24 1523  carboprost (HEMABATE) injection 250 mcg  Every 30 Minutes PRN         08/20/24 1523    08/20/24 1523  miSOPROStol (CYTOTEC) tablet 600 mcg  Once As Needed         08/20/24 1523    08/20/24 1523  methylergonovine (METHERGINE) injection 200 mcg  Once As Needed         08/20/24 1523    08/20/24 1523  hydrOXYzine (ATARAX) tablet 50 mg  Nightly PRN         08/20/24 1523    08/20/24 1523  magnesium hydroxide (MILK OF MAGNESIA) suspension 10 mL  Daily PRN         08/20/24 1523    08/20/24 1523  lanolin topical 1 Application  Every 1 Hour PRN         08/20/24 1523    08/20/24 1523  benzocaine-menthol (DERMOPLAST) 20-0.5 % topical spray  As Needed         08/20/24 1523    08/20/24 1523  witch hazel-glycerin (TUCKS) pad  As Needed         08/20/24 1523    08/20/24 1523  Hydrocortisone Ace-Pramoxine 1-1 % rectal cream 1 Application  As Needed         08/20/24 1523    08/20/24 1523  Hydrocortisone (Perianal) (ANUSOL-HC) 2.5 % rectal cream 1 Application  As Needed         08/20/24 1523    08/20/24 1523  benzocaine (AMERICAINE) 20 % rectal ointment 1 Application  As Needed         08/20/24 1523    08/20/24 1523  ondansetron ODT (ZOFRAN-ODT) disintegrating tablet 4 mg  Every 6 Hours PRN        Placed in \"Or\" Linked Group    08/20/24 1523    08/20/24 1523  ondansetron (ZOFRAN) injection 4 mg  Every 6 Hours PRN        Placed in \"Or\" Linked Group    08/20/24 1523    08/20/24 1400  ibuprofen (ADVIL,MOTRIN) tablet 800 mg  Every 8 Hours Scheduled,   " "Status:  Discontinued         08/20/24 1237    08/20/24 1345  oxytocin (PITOCIN) 30 units in 0.9% sodium chloride 500 mL (premix)  Continuous        Placed in \"Followed by\" Linked Group    08/20/24 1237    08/20/24 1330  oxytocin (PITOCIN) 30 units in 0.9% sodium chloride 500 mL (premix)  Once        Placed in \"Followed by\" Linked Group    08/20/24 1237    08/20/24 1245  VTE Prophylaxis Not Indicated: Low Risk; No Risk Factors (0)  Once         08/20/24 1244    08/20/24 1238  Notify Provider (Specified)  Until Discontinued,   Status:  Canceled         08/20/24 1237    08/20/24 1238  Vital Signs Per Hospital Policy  Per Hospital Policy,   Status:  Canceled         08/20/24 1237    08/20/24 1238  Up as Tolerated  Until Discontinued,   Status:  Canceled         08/20/24 1237    08/20/24 1238  Diet: Regular/House; Fluid Consistency: Thin (IDDSI 0)  Diet Effective Now,   Status:  Canceled         08/20/24 1237    08/20/24 1238  Nurse may remove epidural catheter after delivery.  Until Discontinued,   Status:  Canceled         08/20/24 1237    08/20/24 1238  Transfer to postpartum when discharge criteria met.  Until Discontinued,   Status:  Canceled         08/20/24 1237    08/20/24 1237  Fundal & Lochia Check  As Needed,   Status:  Canceled      Comments: Every 15 Minutes x4, Then Every 30 Minutes x2, Then Every Shift    08/20/24 1237    08/20/24 1237  Fundal & Lochia Check  Every Shift,   Status:  Canceled       08/20/24 1237    08/20/24 1237  acetaminophen (TYLENOL) tablet 650 mg  Every 4 Hours PRN,   Status:  Discontinued         08/20/24 1237    08/20/24 1237  HYDROcodone-acetaminophen (NORCO) 5-325 MG per tablet 1 tablet  Every 4 Hours PRN,   Status:  Discontinued         08/20/24 1237    08/20/24 1237  methylergonovine (METHERGINE) injection 200 mcg  Once As Needed,   Status:  Discontinued         08/20/24 1237    08/20/24 1237  carboprost (HEMABATE) injection 250 mcg  As Needed,   Status:  Discontinued         " 08/20/24 1237    08/20/24 1237  miSOPROStol (CYTOTEC) tablet 600 mcg  As Needed,   Status:  Discontinued         08/20/24 1237    08/20/24 1015  lactated ringers bolus 1,000 mL  Once,   Status:  Discontinued         08/20/24 0917    08/20/24 1015  ropivacaine (NAROPIN) 0.2 % injection  Continuous,   Status:  Discontinued         08/20/24 0917    08/20/24 0916  Vital Signs Per Anesthesia Guidelines  Per Order Details,   Status:  Canceled        Comments: Every 2 Minutes x5, Every 5 Minutes x4, Then If Stable Every 15 Minutes    08/20/24 0917    08/20/24 0916  Start IV with #16 or #18 gauge angiocath.  Once,   Status:  Canceled         08/20/24 0917    08/20/24 0916  Fetal Heart Rate Monitor  Once,   Status:  Canceled         08/20/24 0917    08/20/24 0916  Nurse or anesthesiologist to remain with patient for 15 minutes following dosing.  Until Discontinued,   Status:  Canceled         08/20/24 0917    08/20/24 0916  Facilitate maternal postion on side and maintain uterine displacement.  Until Discontinued,   Status:  Canceled         08/20/24 0917    08/20/24 0916  Consult anesthesia services prior to changing epidural infusion/rate.  Until Discontinued,   Status:  Canceled         08/20/24 0917    08/20/24 0916  Notify physician for the following conditions:  Until Discontinued,   Status:  Canceled         08/20/24 0917    08/20/24 0915  ePHEDrine Sulfate (Pressors) 5 MG/ML injection 10 mg  Every 10 Minutes PRN,   Status:  Discontinued         08/20/24 0917    08/20/24 0915  ondansetron (ZOFRAN) injection 4 mg  Once As Needed,   Status:  Discontinued         08/20/24 0917    08/20/24 0915  famotidine (PEPCID) injection 20 mg  Once As Needed,   Status:  Discontinued         08/20/24 0917    08/20/24 0800  lactated ringers bolus 1,000 mL  Once As Needed,   Status:  Discontinued         08/19/24 2135    08/20/24 0800  oxytocin (PITOCIN) 30 units in 0.9% sodium chloride 500 mL (premix)  Titrated,   Status:  Discontinued          08/19/24 2135 08/20/24 0400  miSOPROStol (CYTOTEC) tablet 25 mcg  Every 4 Hours PRN,   Status:  Discontinued         08/20/24 0305    08/20/24 0000  Vital Signs q 4 while awake  Every 4 Hours,   Status:  Canceled      Comments: While the patient is awake.    08/19/24 2135 08/20/24 0000  miSOPROStol (CYTOTEC) split tablet 25 mcg  Every 4 Hours PRN         08/19/24 2135 08/19/24 2300  lactated ringers infusion  Continuous,   Status:  Discontinued         08/19/24 2135 08/19/24 2249  Inpatient Case Management  Consult  Once,   Status:  Canceled        Provider:  (Not yet assigned)    08/19/24 2248 08/19/24 2214  Fentanyl, Urine - Urine, Clean Catch  Once         08/19/24 2213 08/19/24 2132  Admit To Obstetrics Inpatient  Once         08/19/24 2135 08/19/24 2132  Code Status and Medical Interventions: CPR (Attempt to Resuscitate); Full  Continuous,   Status:  Canceled         08/19/24 2135 08/19/24 2132  Obtain Informed Consent  Once,   Status:  Canceled         08/19/24 2135 08/19/24 2132  VTE Prophylaxis Not Indicated: Low Risk; No Risk Factors (0)  Once         08/19/24 2135 08/19/24 2132  Vital Signs Per Hospital Policy  Per Hospital Policy,   Status:  Canceled         08/19/24 2135 08/19/24 2132  Confirm Presence of Amniotic Fluid if Patient Presents With SROM  Once,   Status:  Canceled         08/19/24 2135 08/19/24 2132  Mini-Prep Prior to Delivery  Once,   Status:  Canceled         08/19/24 2135 08/19/24 2132  Continuous Fetal Monitoring With NST on Admission and Prior to Initiation of Oxytocin.  Per Order Details,   Status:  Canceled        Comments: Continuous Fetal Monitoring With NST on Admission & Prior to Initiation of Oxytocin.    08/19/24 2135 08/19/24 2132  External Uterine Contraction Monitoring  Per Hospital Policy,   Status:  Canceled         08/19/24 2135 08/19/24 2132  Notify Provider (Specified)  Until Discontinued,   Status:   Canceled         24  Notify Provider of Tachysystole (Per Hospital Algorithm)  Until Discontinued,   Status:  Canceled         24  Notify Provider if Membranes Ruptured, Bleeding Greater Than 1 Pad Per Hour, Fetal Heart Tone Abnormality or Severe Pain  Until Discontinued,   Status:  Canceled         24  May Ambulate if Membranes Intact or Head Engaged With Ruptured BOW or Normal Tracing for 20 Minutes  Until Discontinued,   Status:  Canceled         24  Initiate Group Beta Strep (GBS) Prophylaxis Protocol, If Criteria Met  Continuous,   Status:  Canceled        Comments: NO TREATMENT RECOMMENDED IF: 1) Maternal GBS Status Known Negative 2) Scheduled  Birth With Intact Membranes, Not in Labor 3) Maternal GBS Status Unknown, No Risk Factors  TREAT WITH ANTIBIOTICS IF:  1) Maternal GBS Status Known Positive 2) Maternal GBS Status Unknown With Risk Factors: a)  Previous Infant Affected By GBS Infection b) GBS Urinary Tract Infection (UTI) or Bacteriuria During Pregnancy c) Unexplained Maternal Fever (100.4F (38C) or Greater) During Labor d)  Prolonged Rupture of Membranes (18 or More Hours) e) Gestational Age Less Than 37 Weeks    24  Assess Need for Indwelling Urinary Catheter - Follow Removal Protocol  Continuous,   Status:  Canceled        Comments: Indwelling Urinary Catheter Removal Criteria  Discontinue Indwelling Urinary Catheter Unless One of the Following is Present:  Urinary Retention or Obstruction  Chronic Urinary Catheter Use  End of Life  Critical Illness with Strict I/O   Tract or Abdominal Surgery  Stage 3/4 Sacral / Perineal Wound  Required Activity Restriction: Trauma  Required Activity Restriction: Spine Surgery  If Patient is Being Followed by Urology Contact Them PRIOR to Removal  Do Not Remove Indwelling Urinary Catheter Order is Present with a  "CLINICAL REASON to Maintain the Catheter. Provider is Required to Include a Clinical Reason to Maintain a Urinary Catheter    Patient Admitted With Indwelling Urinary Catheter (Not Placed at [facility], [place])  Assess for Continued Need & Document Medical Necessity  If Infection is Suspected, Contact the Provider       Placed in \"And\" Linked Group    08/19/24 2135 08/19/24 2132  Urinary Catheter Care  Every Shift,   Status:  Canceled      Placed in \"And\" Linked Group    08/19/24 2135 08/19/24 2132  NPO Diet NPO Type: Ice Chips  Diet Effective Now,   Status:  Canceled         08/19/24 2135 08/19/24 2132  Treponema pallidum AB w/Reflex RPR  STAT         08/19/24 2135 08/19/24 2132  CBC & Differential  STAT         08/19/24 2135 08/19/24 2132  Urine Drug Screen - Urine, Clean Catch  STAT         08/19/24 2135 08/19/24 2132  Type & Screen  STAT         08/19/24 2135 08/19/24 2132  Insert Peripheral IV  Once,   Status:  Canceled         08/19/24 2135 08/19/24 2132  Saline Lock & Maintain IV Access  Continuous,   Status:  Canceled         08/19/24 2135 08/19/24 2132  CBC Auto Differential  PROCEDURE ONCE         08/19/24 2135 08/19/24 2131  ondansetron ODT (ZOFRAN-ODT) disintegrating tablet 4 mg  Every 6 Hours PRN,   Status:  Discontinued        Placed in \"Or\" Linked Group    08/19/24 2135 08/19/24 2131  ondansetron (ZOFRAN) injection 4 mg  Every 6 Hours PRN,   Status:  Discontinued        Placed in \"Or\" Linked Group    08/19/24 2135 08/19/24 2131  terbutaline (BRETHINE) injection 0.2 mg  As Needed,   Status:  Discontinued         08/19/24 2135 08/19/24 2131  sodium chloride (NS) irrigation solution 1,000 mL  Once As Needed,   Status:  Discontinued         08/19/24 2135 08/19/24 2131  Position Change - For Intra-Uterine Resusitation for Hypertonus, HyperStimulation or Non-Reassuring Fetal Status  As Needed,   Status:  Canceled       08/19/24 2135 08/19/24 2131  Insert Indwelling " "Urinary Catheter  As Needed,   Status:  Canceled      Comments: After epidural PRN.  Perform Nasal Decolonization all patients with bhardwaj cath   Placed in \"And\" Linked Group    08/19/24 2135 08/19/24 2131  sodium chloride 0.9 % flush 10 mL  As Needed,   Status:  Discontinued         08/19/24 2135 08/19/24 2131  sodium chloride 0.9 % infusion 40 mL  As Needed,   Status:  Discontinued         08/19/24 2135 08/19/24 2131  acetaminophen (TYLENOL) tablet 650 mg  Every 4 Hours PRN,   Status:  Discontinued         08/19/24 2135 08/19/24 2131  butorphanol (STADOL) injection 1 mg  Every 2 Hours PRN,   Status:  Discontinued         08/19/24 2135 08/19/24 2131  butorphanol (STADOL) injection 2 mg  Every 3 Hours PRN,   Status:  Discontinued         08/19/24 2135 08/19/24 0000  Group B Streptococcus Culture - Swab, Vaginal/Rectum        Comments: This is an external result entered through the Results Console.      08/19/24 2130 08/19/24 0000  Hepatitis B Surface Antigen        Comments: This is an external result entered through the Results Console.      08/19/24 2130 08/19/24 0000  RPR Qualitative with Reflex to Quant        Comments: This is an external result entered through the Results Console.      08/19/24 2130 08/19/24 0000  Rubella Antibody, IgG        Comments: This is an external result entered through the Results Console.      08/19/24 2130 08/19/24 0000  HIV-1 Antibody, EIA        Comments: This is an external result entered through the Results Console.      08/19/24 2130    Unscheduled  Apply Ice to Perineum  As Needed      Comments: For 20 min q 2 hrs    08/20/24 1523    Unscheduled  Kpad  As Needed      Comments: For pain    08/20/24 1523    Unscheduled  Warm compress  As Needed       08/20/24 1523    Unscheduled  Apply ace wrap, tight bra, or binder  As Needed       08/20/24 1523    Unscheduled  Apply ice packs  As Needed       08/20/24 1523                     Operative/Procedure " Notes (all)        Marisol Orellana DO at 24 1242  Version 1 of 1         Vaginal Delivery Procedure Note    Anna Calix  Gestational Age-37.0 weeks        OBGYN: Marisol Orellana DO      Pre-op Diagnosis:   Pt 35 y/o  @ 37.0 weeks for IOL due to severe IUGR and h/o IUFD, maternal anxiety      Anesthesia: Epidural        Detailed Description of Procedure     The patient was prepped and draped in normal sterile fashion. The head was delivered without difficulty. There was no nuchal cord. Anterior and posterior shoulders delivered without any problems. The rest of the infant was delivered in controlled fashion.The infant was bulb suctioned at delivery. The placenta delivered intact. The patient tolerated the procedure well and went to the recovery room in stable condition.        Time of delivery: 1235  Maternal Blood Type: AB Positive  Fetal Gender: Female  Nuchal Cord: No  Tears: None   Blood Cord: Yes  Estimated Blood Loss: 100cc  Placenta: Spontaneous, Delivered Intact   APGARS:           Disposition: Transfer to Women's Health Floor  Condition: Stable    Marisol Orellana DO     Date: 2024  Time: 12:42 EDT      Electronically signed by Marisol Orellana DO at 24 1242

## 2024-08-21 NOTE — PLAN OF CARE
Goal Outcome Evaluation:  Plan of Care Reviewed With: patient        Progress: improving  Outcome Evaluation: small rubra/ voiding without difficulty/ makes freq trips to visit  in nicu/ rates pain acceptable with scheduled motrin

## 2024-08-21 NOTE — PROGRESS NOTES
Spontaneous Vaginal Delivery Progress Note      Hospital Course: G 5, now P 5005. Patient was admitted on 8/19/2024  9:12 PM with IUP at 37w0d weeks gestation secondary to History of IUFD [Z87.59]  History of IUFD [Z87.59]. Patient underwent a normal spontaneous vaginal delivery.       Patient stable. No complaints.     signs in last 24 hours:    Vital Signs Range for the last 24 hours              Temp:  [97.3 °F (36.3 °C)-98.7 °F (37.1 °C)] 98.7 °F (37.1 °C)  Heart Rate:  [] 68  Resp:  [16-18] 18  BP: ()/(51-67) 100/56     Radiology     Imaging Results (Last 24 Hours)       ** No results found for the last 24 hours. **             Labs     Lab Results (last 24 hours)       Procedure Component Value Units Date/Time    CBC & Differential [[id number]]  (Abnormal) Collected: 08/21/24 0550    Specimen: Blood Updated: 08/21/24 0600    Narrative:      The following orders were created for panel order CBC & Differential.  Procedure                               Abnormality         Status                     ---------                               -----------         ------                     CBC Auto Differential[[id number]]        Abnormal            Final result                 Please view results for these tests on the individual orders.    CBC Auto Differential [[id number]]  (Abnormal) Collected: 08/21/24 0550    Specimen: Blood Updated: 08/21/24 0600     WBC 9.61 10*3/mm3      RBC 3.70 10*6/mm3      Hemoglobin 11.9 g/dL      Hematocrit 35.5 %      MCV 95.9 fL      MCH 32.2 pg      MCHC 33.5 g/dL      RDW 13.9 %      RDW-SD 48.7 fl      MPV 10.9 fL      Platelets 135 10*3/mm3      Neutrophil % 77.3 %      Lymphocyte % 14.7 %      Monocyte % 5.4 %      Eosinophil % 0.7 %      Basophil % 0.4 %      Immature Grans % 1.5 %      Neutrophils, Absolute 7.43 10*3/mm3      Lymphocytes, Absolute 1.41 10*3/mm3      Monocytes, Absolute 0.52 10*3/mm3      Eosinophils, Absolute 0.07 10*3/mm3      Basophils, Absolute  0.04 10*3/mm3      Immature Grans, Absolute 0.14 10*3/mm3      nRBC 0.0 /100 WBC     Treponema pallidum AB w/Reflex RPR [[id number]]  (Normal) Collected: 08/19/24 2146    Specimen: Blood Updated: 08/20/24 1222     Treponemal AB Total Non-Reactive    Narrative:      Reactive results will reflex RPR testing.              Review of Systems    The following systems were reviewed and negative;  ENT, respiratory, cardiovascular, gastrointestinal, genitourinary, breast, endocrine and allergies / immunologic.      Physical Exam:      General Appearance:    Alert, cooperative, in no acute distress   Head:    Normocephalic, without obvious abnormality, atraumatic   Eyes:            Lids and lashes normal, conjunctivae and sclerae normal, no   icterus, no pallor, corneas clear, PERRLA   Ears:    Ears appear intact with no abnormalities noted   Throat:   No oral lesions, no thrush, oral mucosa moist   Neck:   No adenopathy, supple, trachea midline, no thyromegaly, no     carotid bruit, no JVD   Back:     No kyphosis present, no scoliosis present, no skin lesions,       erythema or scars, no tenderness to percussion or                   palpation,   range of motion normal   Lungs:     Clear to auscultation,respirations regular, even and                   unlabored    Heart:    Regular rhythm and normal rate, normal S1 and S2, no            murmur, no gallop, no rub, no click   Breast Exam:    Deferred   Abdomen:     Normal bowel sounds, no masses, no organomegaly, soft        non-tender, non-distended, no guarding, no rebound                 tenderness   Genitalia:    Deferred   Extremities:   Moves all extremities well, no edema, no cyanosis, no              redness   Pulses:   Pulses palpable and equal bilaterally   Skin:   No bleeding, bruising or rash   Lymph nodes:   No palpable adenopathy   Neurologic:   Cranial nerves 2 - 12 grossly intact, sensation intact, DTR        present and equal bilaterally                  Assessment:  1.  . PPD#1. Patient doing well. No complaints.     Plan:  1. Will continue current plan of care and anticipate discharge to home in the AM.      Chacho Wilson III, MD  24  08:56 EDT

## 2024-08-22 VITALS
WEIGHT: 163 LBS | BODY MASS INDEX: 30.78 KG/M2 | HEART RATE: 82 BPM | RESPIRATION RATE: 18 BRPM | DIASTOLIC BLOOD PRESSURE: 61 MMHG | TEMPERATURE: 98 F | SYSTOLIC BLOOD PRESSURE: 116 MMHG | OXYGEN SATURATION: 98 % | HEIGHT: 61 IN

## 2024-08-22 RX ORDER — PSEUDOEPHEDRINE HCL 30 MG
100 TABLET ORAL 2 TIMES DAILY PRN
Qty: 30 CAPSULE | Refills: 0 | Status: SHIPPED | OUTPATIENT
Start: 2024-08-22

## 2024-08-22 RX ORDER — IBUPROFEN 600 MG/1
600 TABLET, FILM COATED ORAL EVERY 6 HOURS PRN
Qty: 40 TABLET | Refills: 0 | Status: SHIPPED | OUTPATIENT
Start: 2024-08-22

## 2024-08-22 RX ADMIN — PRENATAL VITAMINS-IRON FUMARATE 27 MG IRON-FOLIC ACID 0.8 MG TABLET 1 TABLET: at 09:33

## 2024-08-22 RX ADMIN — IBUPROFEN 800 MG: 800 TABLET, FILM COATED ORAL at 09:33

## 2024-08-22 RX ADMIN — DOCUSATE SODIUM 100 MG: 100 CAPSULE, LIQUID FILLED ORAL at 09:33

## 2024-08-22 NOTE — CASE MANAGEMENT/SOCIAL WORK
Case Management/Social Work    Patient Name:  Anna Calix  YOB: 1990  MRN: [id number]  Admit Date:  8/19/2024      SS received consult for MOB Hep C positive. Pt is 33 y/O Anna Calix who delivered a viable baby girl weighing 6 pounds 4.9 ounces and 19.21 inches. Pt was named Gerber Beard. Pt lives at [address] [place] [address] in [facility] with FOB Yehuda Beard & 5 y/O Rimma Haque. Pt has two other children, 17 y/O Jennifer Haque and also another child Deven Valdez.     Pt's UDS results are negative. Infant's UDS /meconium not collected. Pt has hx of substance abuse. Pt states she has completed inpatient substance abuse treatment at [facility], graduated program and remains sober. Per Pt she works at [employer] currently. Pt has hx of CPS involvement, states son Jennifer was removed by CPS and placed in foster care. Also her other child Deven has been adopted. No open cases with Child Protective Services. Pt has custody of her 5 Y/O Rimma. No report to made to CPS.      Infant ok to be discharged home with Mother.     Infant care supplies, including car seat are available. FOB to provide transportation.            Electronically signed by:  JAMAL Hdez  08/22/24 16:49 EDT

## 2024-08-22 NOTE — DISCHARGE INSTRUCTIONS
You may shower but no tub baths or submersion in water for 6 weeks.  No tampons, douching , or intercourse for 6 weeks.  Limit your lifting and pulling to about 15lb for  6 weeks.  Notify your doctor for fever, chills, worsening abdominal pain, foul odor to your vaginal discharge, vaginal bleeding that soaks a small pad in a hour or less, passing clots, swelling in your hands face or feet or visual changes such as headache unrelieved with rest or tylenol, blurry or spotty vision.  See attached education sheets.

## 2024-08-22 NOTE — DISCHARGE SUMMARY
"[facility]  Delivery Discharge Summary    Primary OB Clinician:     EDC: Estimated Date of Delivery: 9/10/24    Gestational Age:37w0d    Antepartum complications: none    Date of Delivery: 2024   Time of Delivery: 12:35 PM     Delivered By:  Marisol Orellana     Delivery Type: Vaginal, Spontaneous      Tubal Ligation: n/a    Baby:female  infant;   Apgar:  8  @ 1 minute /   Apgar:  8  @ 5 minutes   Weight: 2830 g (6 lb 3.8 oz)      Anesthesia: Epidural      Intrapartum complications: None    [unfilled]       Lab Results   Component Value Date    ABO AB 2024    RH Positive 2024        Lab Results   Component Value Date    HGB 11.9 (L) 2024    HCT 35.5 2024       Subjective   Subjective  Postpartum Day 2:  Delivery    The patient feels well.  Her pain is well controlled with nonsteroidal anti-inflammatory drugs.   She is ambulating well.  Patient describes her bleeding as thin lochia.        Objective     Objective:  Vital signs (most recent): Blood pressure 116/61, pulse 82, temperature 98 °F (36.7 °C), temperature source Oral, resp. rate 18, height 154.9 cm (61\"), weight 73.9 kg (163 lb), last menstrual period 2023, SpO2 98%, currently breastfeeding.     Vital Signs Range for the last 24 hours  Temperature: Temp:  [98 °F (36.7 °C)-98.2 °F (36.8 °C)] 98 °F (36.7 °C)   Temp Source: Temp src: Oral   BP: BP: ()/(60-61) 116/61   Pulse: Heart Rate:  [66-82] 82   Respirations: Resp:  [16-18] 18   Weight:       Admit Height:  Height: 154.9 cm (61\")    Physical Exam:  General:  no acute distresss.  Abdomen: Fundus: appropriate, firm, non tender  Extremities: normal, atraumatic, no cyanosis, and trace edema.         Assesment and Plan:    PPD 2 s/p   Follow-up appointment with [facility] in 3 weeks.    Discharge Date: 2024; Discharge Time: 10:06 EDT        Vicki Roland DO  2024  10:05 EDT    "

## 2024-08-22 NOTE — DISCHARGE INSTR - APPOINTMENTS
You have a follow up appointment with dr velazquez on September 11th at 4 45 pm with anahi aguilar

## 2024-09-04 ENCOUNTER — MATERNAL SCREENING (OUTPATIENT)
Dept: CALL CENTER | Facility: HOSPITAL | Age: 34
End: 2024-09-04
Payer: MEDICAID

## 2024-09-04 NOTE — OUTREACH NOTE
Maternal Screening Survey      Flowsheet Row Responses   Facility patient discharged from? [facility]   Attempt successful? Yes   Call start time 851   Call end time 853   EPD Scale: Able to Laugh 0-->as much as she always could   EPD Scale: Looked Forward 0-->as much as she ever did   EPD Scale: Blamed Self 0-->no, never   EPD Scale: Been Anxious 0-->no, not at all   EPD Scale: Felt Panicky 0-->no, not at all   EPD Scale: Things Getting on Top 0-->no, has been coping as well as ever   EPD Scale: Difficulty Sleeping 0-->no, not at all   EPD Scale: Sad or Miserable 0-->no, not at all   EPD Scale: Crying 0-->no, never   EPD Scale: Thought of Harming Self 0-->never   [place]  Depression Score 0   Did any of your parents have problems with alcohol or drug use? No   Do any of your peers have problems with alcohol or drug use? No   Does your partner have problems with alcohol or drug use? No   Before you were pregnant did you have problems with alcohol or drug use? (past) No   In the past month, did you drink beer, wine, liquor or use any other drugs? (pregnancy) No   Maternal Screening call completed Yes   Call end time 853              Colleen NUÑEZ - Registered Nurse

## 2024-11-10 ENCOUNTER — PREP FOR SURGERY (OUTPATIENT)
Dept: OTHER | Facility: HOSPITAL | Age: 34
End: 2024-11-10
Payer: MEDICAID

## 2024-11-10 DIAGNOSIS — Z30.2 ENCOUNTER FOR FEMALE STERILIZATION PROCEDURE: Primary | ICD-10-CM

## 2024-11-10 RX ORDER — SODIUM CHLORIDE 0.9 % (FLUSH) 0.9 %
10 SYRINGE (ML) INJECTION EVERY 12 HOURS SCHEDULED
OUTPATIENT
Start: 2024-11-10

## 2024-11-10 RX ORDER — SODIUM CHLORIDE 9 MG/ML
40 INJECTION, SOLUTION INTRAVENOUS AS NEEDED
OUTPATIENT
Start: 2024-11-10

## 2024-11-10 RX ORDER — SODIUM CHLORIDE 0.9 % (FLUSH) 0.9 %
10 SYRINGE (ML) INJECTION AS NEEDED
OUTPATIENT
Start: 2024-11-10

## 2024-11-10 NOTE — H&P
[facility] Women's Care -- Postpartum Note    Patient Identification:  Name: Anna Calix  Age: 34 year old  Sex: female  : 1990  MRN: [id number]    ----------------------------------------------------------------------------------------------------------------------  Subjective     Chief Complaint:  Chief Complaint   Patient presents with   Postpartum Visit   Physical Exam, Gynecologic     History of Presenting Illness:  Patient is a 34 year old female that presents today for post partum visit. Patient is s/p  on 2024 at 37 weeks gestation due to severe IUGR and hx of IUFD. Patient states that she is overall doing well. Feeding Method: Bottle, going well. She has not resumed intercourse/exercise. She denies vaginal bleeding. She did not sustain perineal tear. She denies any perineal pain. She denies any abdominal or pelvic pain. Denies breast issues. Denies any UTI symptoms. She denies any anxiety, depression, baby blues, or thoughts of harming self or infant. Pap due, performed today. No pap on file previously, denies hx of abnormal pap or STI in past.     Birth control desired: Yes  Risks and benefits of all options were discussed. She would like to proceed with tubal ligation with filshie clips. R/B and different BTL procedures discussed. She will be brought back for preop visit. Pt desires OCP while pending surgical sterilization. She is non smoker, no hx of HTN/TIA/CVA/VTE. R/B discussed, she verbalized understanding.     PHQ 2: 0,  met with patient, no issues.   ----------------------------------------------------------------------------------------------------------------------  Review of Systems   Constitutional: Negative for chills, fatigue and fever.   HENT: Negative for congestion and sore throat.   Eyes: Negative for pain and visual disturbance.   Respiratory: Negative for cough and shortness of breath.   Cardiovascular: Negative for chest pain, palpitations and leg swelling.  "  Breasts: Negative for lumps, pain, nipple discharge and skin changes.   Gastrointestinal: Negative for abdominal pain, blood in stool, constipation, diarrhea, nausea and vomiting.   Endocrine: Negative for cold intolerance and heat intolerance.   Genitourinary: Negative for dyspareunia, dysuria, frequency, menstrual problem, pelvic pain, urgency, vaginal discharge and vaginal pain.   Musculoskeletal: Negative for arthralgias and myalgias.   Skin: Negative for rash and wound.   Neurological: Negative for dizziness, syncope, weakness and headaches.   Psychiatric/Behavioral: Negative for dysphoric mood. The patient is not nervous/anxious.     ---------------------------------------------------------------------------------------------------------------------   Past Medical History:   Diagnosis Date   Liver disease   Viral hepatitis C without hepatic coma   S/P treatment in past     No past surgical history on file.  No family history on file.  Social History     Tobacco Use   Smoking status: Never   Passive exposure: Never   Smokeless tobacco: Never   Vaping Use   Vaping status: Never Used   Substance Use Topics   Alcohol use: Never   Drug use: Never     ---------------------------------------------------------------------------------------------------------------------   Allergies:  Penicillins  ---------------------------------------------------------------------------------------------------------------------   Current Medications:  No current outpatient medications on file.     No current facility-administered medications for this visit.     ---------------------------------------------------------------------------------------------------------------------   Objective     Vital Signs:  /68 (Right Arm, Sitting, Regular Adult)  Pulse 66  Temp 97.5 °F (36.4 °C)  Ht 5' 1\" (1.549 m)  Wt 148 lb (67.1 kg)  LMP 12/22/2023 (Pt has not had 1st period yet since baby.)  SpO2 97%  Breastfeeding No  BMI 27.96 " kg/m²  OB Status Recent pregnancy  Smoking Status Never  BSA 1.7 m²  Body mass index is 27.96 kg/m².    PHYSICAL EXAM  Physical Exam    Constitutional:   General: She is not in acute distress.  Appearance: Normal appearance.   HENT:   Head: Normocephalic and atraumatic.   Mouth/Throat:   Mouth: Mucous membranes are moist.   Eyes:   Pupils: Pupils are equal, round, and reactive to light.   Cardiovascular:   Rate and Rhythm: Normal rate and regular rhythm.   Heart sounds: No murmur heard.  Pulmonary:   Effort: Pulmonary effort is normal.   Breath sounds: Normal breath sounds. No wheezing, rhonchi or rales.   Abdominal:   Palpations: Abdomen is soft.   Tenderness: There is no abdominal tenderness.   Genitourinary:  Genitalia: external female genitalia normal.   Labia Minora: Normal.   Labia Majora: Normal.   Surrounding Skin: Normal.   Chaperone Present.    Urethra: Normal.   Vagina: Normal.   Cervix: Normal.   Uterus: Normal.   Adnexa: Right adnexa normal and left adnexa normal.     Rectum: Normal.   Musculoskeletal:   Cervical back: Neck supple.   Skin:  General: Skin is warm and dry.   Neurological:   Mental Status: She is alert and oriented to person, place, and time.   Psychiatric:   Attention and Perception: Attention normal.   Mood and Affect: Mood normal.   Behavior: Behavior normal.   Judgment: Judgment normal.     Pap: Yes.  Clinical staff present for exam: yes, initials of staff present: CD  ---------------------------------------------------------------------------------------------------------------------  Assessment & Plan     1. Postpartum exam  -Patient cleared for all normal activities. Patient educated to resume exercise/intercourse slowly. Patient may return to work.   -ED/Warning signs discussed   -Maintain annual exams    2. Cervical cancer screening  - GYNECOLOGIC PAP TEST (IMAGE-GUIDED), LIQUID-BASED PREPARATION AND HPV  -Pap today, return in 1 year for well woman exam. Maintain annual exam.      3. Sterilization consult  -Consent signed and put on file, must remain in place for 30 days. She will be scheduled to return for Pre op visit. R/B of procedure discussed, she verbalized understanding.   -BTL with maik gandhi, 11/21/2024 with MT    4. Encounter for initial prescription of contraceptive pills  -UPT negative, she desires OCP pending BTL.   -RX Junel FE 1/20  -Take pills at the same time each day. Skipping pills can cause breakthrough bleeding and increase chances of becoming pregnant.   -Most pill packs have 4-7 day hormone free pills per month, this is when you could have a period.   -If you're sexually active, use back up method of birth control such as condoms for at least 1-2 weeks after starting pills, this allows the pill to be fully effective.   -Medications such as antibiotics, seizure medications, and herbal medications can decrease efficacy of birth control, increasing chance of becoming pregnant. Use back up method if taking these medications.   -Always practice safe sex by using condoms, birth control pills do not protect against STIs.   -If you develop any chest pain, shortness of breath, dizziness, vision changes, or calf pain return to clinic immediately or report to closest ER.     5. Pregnancy examination or test, negative result  - PREGNANCY, URINE      Karen Patterson PA-C   [facility]

## 2024-11-18 NOTE — DISCHARGE INSTRUCTIONS
Please discontinue all blood thinners and anticoagulants (except aspirin) prior to surgery as per your surgeon and cardiologist instructions.  Aspirin may be continued up to the day prior to surgery.    HOLD all diabetic medications the morning of surgery as order by physician.    Please follow instructions on use of prep cloths provided by nurse. Return instruction sheet to pre-op nurse on day of surgery.    Arrival time for surgery on 11/21/24  will be given to you by Dr. Orellana's Office.(0730 AM)    A RESPONSIBLE PERSON MUST REMAIN IN THE WAITING ROOM DURING YOUR PROCEDURE AND A RESPONSIBLE  MUST BE AVAILABLE UPON YOUR DISCHARGE.    General Instructions:  Do NOT eat or drink after midnight 11/20/24 which includes water, mints, or gum.  You may brush your teeth. Dental appliances that are removable must be taken out day of surgery.  Do NOT smoke, chew tobacco, or drink alcohol within 24 hours prior to surgery.  Bring medications in original bottles, any inhalers and if applicable your C-PAP/BI-PAP machine  Bring any papers given to you in the doctor’s office  Wear clean, comfortable clothes and socks  Do NOT wear contact lenses or make-up or dark nail polish.  Bring a case for your glasses if applicable.  Bring crutches or walker if applicable  Leave all other valuables and jewelry at home  If you were given a blood bank armband, continue to wear it until discharged.    Preventing a Surgical Site Infection:  Shower the night before surgery (unless instructed otherwise) using a fresh bar of anti-bacterial soap (such as Dial) and clean washcloth.  Dry with a clean towel and dress in clean clothing.  For 2 to 3 days before surgery, avoid shaving with a razor near where you will have surgery because the razor can irritate skin and make it easier to develop an infection.  Ask your surgeon if you will be receiving antibiotics prior to surgery.  Make sure you, your family, and all healthcare providers clean  their hands with soap and water or an alcohol-based hand  before caring for you or your wound.  If at all possible, quit smoking as many days before surgery as you can.    Day of Surgery:  Upon arrival, a pre-op nurse and anesthesiologist will review your health history, obtain vital signs, and answer questions you may have.  The only belongings needed at this time will be your home medications and if applicable you C-PAP/BI-PAP machine.  If you are staying overnight, your family can leave the rest of your belongings in the car and bring them to your room later.  A pre-op nurse will start an IV and you may receive medication in preparation for surgery.  Due to patient privacy and limited space, only one member of your family will be able to accompany you in the pre-op area.  While you are in surgery your family should notify the waiting room  if they leave the waiting room area and provide a contact number.  Please be aware that surgery does come with discomfort.  We want to make every effort to control your discomfort so please discuss any uncontrolled symptoms with your nurse.  Your doctor will most likely have prescribed pain medications.  If you are going home after surgery you will receive individualized written care instructions before being discharged.  A responsible adult must drive you to and from the hospital on the day of surgery and stay with you for 24 hours.  If you are staying overnight following surgery, you will be transported to your hospital room following the recovery period.

## 2024-11-19 ENCOUNTER — PRE-ADMISSION TESTING (OUTPATIENT)
Dept: PREADMISSION TESTING | Facility: HOSPITAL | Age: 34
End: 2024-11-19
Payer: MEDICAID

## 2024-11-19 DIAGNOSIS — Z30.2 ENCOUNTER FOR FEMALE STERILIZATION PROCEDURE: ICD-10-CM

## 2024-11-19 LAB
ABO GROUP BLD: NORMAL
BASOPHILS # BLD AUTO: 0.04 10*3/MM3 (ref 0–0.2)
BASOPHILS NFR BLD AUTO: 0.8 % (ref 0–1.5)
BLD GP AB SCN SERPL QL: NEGATIVE
DEPRECATED RDW RBC AUTO: 43.9 FL (ref 37–54)
EOSINOPHIL # BLD AUTO: 0.04 10*3/MM3 (ref 0–0.4)
EOSINOPHIL NFR BLD AUTO: 0.8 % (ref 0.3–6.2)
ERYTHROCYTE [DISTWIDTH] IN BLOOD BY AUTOMATED COUNT: 12.8 % (ref 12.3–15.4)
HCG SERPL QL: NEGATIVE
HCT VFR BLD AUTO: 45.4 % (ref 34–46.6)
HGB BLD-MCNC: 14.6 G/DL (ref 12–15.9)
IMM GRANULOCYTES # BLD AUTO: 0.01 10*3/MM3 (ref 0–0.05)
IMM GRANULOCYTES NFR BLD AUTO: 0.2 % (ref 0–0.5)
LYMPHOCYTES # BLD AUTO: 1.63 10*3/MM3 (ref 0.7–3.1)
LYMPHOCYTES NFR BLD AUTO: 32.5 % (ref 19.6–45.3)
MCH RBC QN AUTO: 30 PG (ref 26.6–33)
MCHC RBC AUTO-ENTMCNC: 32.2 G/DL (ref 31.5–35.7)
MCV RBC AUTO: 93.4 FL (ref 79–97)
MONOCYTES # BLD AUTO: 0.35 10*3/MM3 (ref 0.1–0.9)
MONOCYTES NFR BLD AUTO: 7 % (ref 5–12)
NEUTROPHILS NFR BLD AUTO: 2.95 10*3/MM3 (ref 1.7–7)
NEUTROPHILS NFR BLD AUTO: 58.7 % (ref 42.7–76)
NRBC BLD AUTO-RTO: 0 /100 WBC (ref 0–0.2)
PLATELET # BLD AUTO: 273 10*3/MM3 (ref 140–450)
PMV BLD AUTO: 11.4 FL (ref 6–12)
RBC # BLD AUTO: 4.86 10*6/MM3 (ref 3.77–5.28)
RH BLD: POSITIVE
T&S EXPIRATION DATE: NORMAL
WBC NRBC COR # BLD AUTO: 5.02 10*3/MM3 (ref 3.4–10.8)

## 2024-11-19 PROCEDURE — 86900 BLOOD TYPING SEROLOGIC ABO: CPT

## 2024-11-19 PROCEDURE — 36415 COLL VENOUS BLD VENIPUNCTURE: CPT

## 2024-11-19 PROCEDURE — 86850 RBC ANTIBODY SCREEN: CPT

## 2024-11-19 PROCEDURE — 85025 COMPLETE CBC W/AUTO DIFF WBC: CPT

## 2024-11-19 PROCEDURE — 84703 CHORIONIC GONADOTROPIN ASSAY: CPT

## 2024-11-19 PROCEDURE — 86901 BLOOD TYPING SEROLOGIC RH(D): CPT

## 2024-11-21 ENCOUNTER — HOSPITAL ENCOUNTER (OUTPATIENT)
Facility: HOSPITAL | Age: 34
Setting detail: HOSPITAL OUTPATIENT SURGERY
Discharge: HOME | End: 2024-11-21
Attending: OBSTETRICS & GYNECOLOGY | Admitting: OBSTETRICS & GYNECOLOGY
Payer: MEDICAID

## 2024-11-21 ENCOUNTER — APPOINTMENT (OUTPATIENT)
Dept: GENERAL RADIOLOGY | Facility: HOSPITAL | Age: 34
End: 2024-11-21
Payer: MEDICAID

## 2024-11-21 ENCOUNTER — ANESTHESIA (OUTPATIENT)
Dept: PERIOP | Facility: HOSPITAL | Age: 34
End: 2024-11-21
Payer: MEDICAID

## 2024-11-21 ENCOUNTER — ANESTHESIA EVENT (OUTPATIENT)
Dept: PERIOP | Facility: HOSPITAL | Age: 34
End: 2024-11-21
Payer: MEDICAID

## 2024-11-21 VITALS
DIASTOLIC BLOOD PRESSURE: 63 MMHG | SYSTOLIC BLOOD PRESSURE: 118 MMHG | BODY MASS INDEX: 28.02 KG/M2 | WEIGHT: 139 LBS | HEART RATE: 76 BPM | RESPIRATION RATE: 18 BRPM | OXYGEN SATURATION: 99 % | HEIGHT: 59 IN | TEMPERATURE: 97.3 F

## 2024-11-21 DIAGNOSIS — Z30.2 ENCOUNTER FOR FEMALE STERILIZATION PROCEDURE: ICD-10-CM

## 2024-11-21 PROCEDURE — 25010000002 GLYCOPYRROLATE 0.4 MG/2ML SOLUTION: Performed by: NURSE ANESTHETIST, CERTIFIED REGISTERED

## 2024-11-21 PROCEDURE — 25010000002 DEXAMETHASONE PER 1 MG: Performed by: NURSE ANESTHETIST, CERTIFIED REGISTERED

## 2024-11-21 PROCEDURE — 25010000002 LIDOCAINE PF 2% 2 % SOLUTION: Performed by: NURSE ANESTHETIST, CERTIFIED REGISTERED

## 2024-11-21 PROCEDURE — 25010000002 ONDANSETRON PER 1 MG: Performed by: NURSE ANESTHETIST, CERTIFIED REGISTERED

## 2024-11-21 PROCEDURE — 25010000002 FENTANYL CITRATE (PF) 50 MCG/ML SOLUTION: Performed by: NURSE ANESTHETIST, CERTIFIED REGISTERED

## 2024-11-21 PROCEDURE — 25010000002 KETOROLAC TROMETHAMINE PER 15 MG: Performed by: NURSE ANESTHETIST, CERTIFIED REGISTERED

## 2024-11-21 PROCEDURE — 25010000002 NEOSTIGMINE 10 MG/10ML SOLUTION: Performed by: NURSE ANESTHETIST, CERTIFIED REGISTERED

## 2024-11-21 PROCEDURE — 25010000002 PROPOFOL 200 MG/20ML EMULSION: Performed by: NURSE ANESTHETIST, CERTIFIED REGISTERED

## 2024-11-21 DEVICE — THE FILSHIE TUBAL LIGATION SYSTEM - FILSHIE CLIPS FOR PERMANENT FEMALE STERILIZATION BY OCCLUSION OF THE FALLOPIAN TUBES.
Type: IMPLANTABLE DEVICE | Site: FALLOPIAN TUBE | Status: FUNCTIONAL
Brand: FILSHIE® CLIPS (FEMCARE)

## 2024-11-21 RX ORDER — OXYCODONE AND ACETAMINOPHEN 5; 325 MG/1; MG/1
1 TABLET ORAL ONCE AS NEEDED
Status: COMPLETED | OUTPATIENT
Start: 2024-11-21 | End: 2024-11-21

## 2024-11-21 RX ORDER — LIDOCAINE HYDROCHLORIDE 20 MG/ML
INJECTION, SOLUTION EPIDURAL; INFILTRATION; INTRACAUDAL; PERINEURAL AS NEEDED
Status: DISCONTINUED | OUTPATIENT
Start: 2024-11-21 | End: 2024-11-21 | Stop reason: SURG

## 2024-11-21 RX ORDER — ONDANSETRON 2 MG/ML
4 INJECTION INTRAMUSCULAR; INTRAVENOUS AS NEEDED
Status: DISCONTINUED | OUTPATIENT
Start: 2024-11-21 | End: 2024-11-21 | Stop reason: HOSPADM

## 2024-11-21 RX ORDER — PROPOFOL 10 MG/ML
INJECTION, EMULSION INTRAVENOUS AS NEEDED
Status: DISCONTINUED | OUTPATIENT
Start: 2024-11-21 | End: 2024-11-21 | Stop reason: SURG

## 2024-11-21 RX ORDER — SODIUM CHLORIDE, SODIUM LACTATE, POTASSIUM CHLORIDE, CALCIUM CHLORIDE 600; 310; 30; 20 MG/100ML; MG/100ML; MG/100ML; MG/100ML
125 INJECTION, SOLUTION INTRAVENOUS ONCE
Status: DISCONTINUED | OUTPATIENT
Start: 2024-11-21 | End: 2024-11-21 | Stop reason: HOSPADM

## 2024-11-21 RX ORDER — DEXAMETHASONE SODIUM PHOSPHATE 4 MG/ML
INJECTION, SOLUTION INTRA-ARTICULAR; INTRALESIONAL; INTRAMUSCULAR; INTRAVENOUS; SOFT TISSUE AS NEEDED
Status: DISCONTINUED | OUTPATIENT
Start: 2024-11-21 | End: 2024-11-21 | Stop reason: SURG

## 2024-11-21 RX ORDER — HYDROCODONE BITARTRATE AND ACETAMINOPHEN 5; 325 MG/1; MG/1
1 TABLET ORAL EVERY 6 HOURS PRN
Qty: 10 TABLET | Refills: 0 | Status: SHIPPED | OUTPATIENT
Start: 2024-11-21

## 2024-11-21 RX ORDER — NEOSTIGMINE METHYLSULFATE 1 MG/ML
INJECTION INTRAVENOUS AS NEEDED
Status: DISCONTINUED | OUTPATIENT
Start: 2024-11-21 | End: 2024-11-21 | Stop reason: SURG

## 2024-11-21 RX ORDER — SODIUM CHLORIDE 0.9 % (FLUSH) 0.9 %
10 SYRINGE (ML) INJECTION EVERY 12 HOURS SCHEDULED
Status: DISCONTINUED | OUTPATIENT
Start: 2024-11-21 | End: 2024-11-21 | Stop reason: HOSPADM

## 2024-11-21 RX ORDER — GLYCOPYRROLATE 0.2 MG/ML
INJECTION INTRAMUSCULAR; INTRAVENOUS AS NEEDED
Status: DISCONTINUED | OUTPATIENT
Start: 2024-11-21 | End: 2024-11-21 | Stop reason: SURG

## 2024-11-21 RX ORDER — SODIUM CHLORIDE 9 MG/ML
40 INJECTION, SOLUTION INTRAVENOUS AS NEEDED
Status: DISCONTINUED | OUTPATIENT
Start: 2024-11-21 | End: 2024-11-21 | Stop reason: HOSPADM

## 2024-11-21 RX ORDER — IPRATROPIUM BROMIDE AND ALBUTEROL SULFATE 2.5; .5 MG/3ML; MG/3ML
3 SOLUTION RESPIRATORY (INHALATION) ONCE AS NEEDED
Status: DISCONTINUED | OUTPATIENT
Start: 2024-11-21 | End: 2024-11-21 | Stop reason: HOSPADM

## 2024-11-21 RX ORDER — ONDANSETRON 2 MG/ML
INJECTION INTRAMUSCULAR; INTRAVENOUS AS NEEDED
Status: DISCONTINUED | OUTPATIENT
Start: 2024-11-21 | End: 2024-11-21 | Stop reason: SURG

## 2024-11-21 RX ORDER — KETOROLAC TROMETHAMINE 30 MG/ML
30 INJECTION, SOLUTION INTRAMUSCULAR; INTRAVENOUS EVERY 6 HOURS PRN
Status: DISCONTINUED | OUTPATIENT
Start: 2024-11-21 | End: 2024-11-21 | Stop reason: HOSPADM

## 2024-11-21 RX ORDER — SODIUM CHLORIDE 0.9 % (FLUSH) 0.9 %
10 SYRINGE (ML) INJECTION AS NEEDED
Status: DISCONTINUED | OUTPATIENT
Start: 2024-11-21 | End: 2024-11-21 | Stop reason: HOSPADM

## 2024-11-21 RX ORDER — MIDAZOLAM HYDROCHLORIDE 1 MG/ML
1 INJECTION, SOLUTION INTRAMUSCULAR; INTRAVENOUS
Status: DISCONTINUED | OUTPATIENT
Start: 2024-11-21 | End: 2024-11-21 | Stop reason: HOSPADM

## 2024-11-21 RX ORDER — FENTANYL CITRATE 50 UG/ML
50 INJECTION, SOLUTION INTRAMUSCULAR; INTRAVENOUS
Status: DISCONTINUED | OUTPATIENT
Start: 2024-11-21 | End: 2024-11-21 | Stop reason: HOSPADM

## 2024-11-21 RX ORDER — ROCURONIUM BROMIDE 10 MG/ML
INJECTION, SOLUTION INTRAVENOUS AS NEEDED
Status: DISCONTINUED | OUTPATIENT
Start: 2024-11-21 | End: 2024-11-21 | Stop reason: SURG

## 2024-11-21 RX ORDER — NORETHINDRONE ACETATE AND ETHINYL ESTRADIOL .02; 1 MG/1; MG/1
1 TABLET ORAL DAILY
COMMUNITY

## 2024-11-21 RX ORDER — MEPERIDINE HYDROCHLORIDE 25 MG/ML
12.5 INJECTION INTRAMUSCULAR; INTRAVENOUS; SUBCUTANEOUS
Status: DISCONTINUED | OUTPATIENT
Start: 2024-11-21 | End: 2024-11-21 | Stop reason: HOSPADM

## 2024-11-21 RX ORDER — SODIUM CHLORIDE 0.9 % (FLUSH) 0.9 %
SYRINGE (ML) INJECTION AS NEEDED
Status: DISCONTINUED | OUTPATIENT
Start: 2024-11-21 | End: 2024-11-21 | Stop reason: HOSPADM

## 2024-11-21 RX ORDER — FENTANYL CITRATE 50 UG/ML
INJECTION, SOLUTION INTRAMUSCULAR; INTRAVENOUS AS NEEDED
Status: DISCONTINUED | OUTPATIENT
Start: 2024-11-21 | End: 2024-11-21 | Stop reason: SURG

## 2024-11-21 RX ORDER — IBUPROFEN 600 MG/1
600 TABLET, FILM COATED ORAL EVERY 6 HOURS PRN
Qty: 60 TABLET | Refills: 0 | Status: SHIPPED | OUTPATIENT
Start: 2024-11-21

## 2024-11-21 RX ORDER — FAMOTIDINE 10 MG/ML
INJECTION, SOLUTION INTRAVENOUS AS NEEDED
Status: DISCONTINUED | OUTPATIENT
Start: 2024-11-21 | End: 2024-11-21 | Stop reason: SURG

## 2024-11-21 RX ORDER — KETOROLAC TROMETHAMINE 30 MG/ML
INJECTION, SOLUTION INTRAMUSCULAR; INTRAVENOUS AS NEEDED
Status: DISCONTINUED | OUTPATIENT
Start: 2024-11-21 | End: 2024-11-21 | Stop reason: SURG

## 2024-11-21 RX ADMIN — FAMOTIDINE 20 MG: 10 INJECTION, SOLUTION INTRAVENOUS at 08:18

## 2024-11-21 RX ADMIN — PROPOFOL 150 MG: 10 INJECTION, EMULSION INTRAVENOUS at 08:21

## 2024-11-21 RX ADMIN — ONDANSETRON 4 MG: 2 INJECTION INTRAMUSCULAR; INTRAVENOUS at 08:18

## 2024-11-21 RX ADMIN — FENTANYL CITRATE 100 MCG: 50 INJECTION INTRAMUSCULAR; INTRAVENOUS at 08:25

## 2024-11-21 RX ADMIN — ROCURONIUM BROMIDE 20 MG: 10 SOLUTION INTRAVENOUS at 08:21

## 2024-11-21 RX ADMIN — GLYCOPYRROLATE 0.8 MG: 0.4 INJECTION INTRAMUSCULAR; INTRAVENOUS at 08:36

## 2024-11-21 RX ADMIN — DESOGESTREL AND ETHINYL ESTRADIOL 5 MG: KIT at 08:36

## 2024-11-21 RX ADMIN — DEXAMETHASONE SODIUM PHOSPHATE 4 MG: 4 INJECTION, SOLUTION INTRA-ARTICULAR; INTRALESIONAL; INTRAMUSCULAR; INTRAVENOUS; SOFT TISSUE at 08:25

## 2024-11-21 RX ADMIN — OXYCODONE HYDROCHLORIDE AND ACETAMINOPHEN 1 TABLET: 5; 325 TABLET ORAL at 09:04

## 2024-11-21 RX ADMIN — KETOROLAC TROMETHAMINE 30 MG: 30 INJECTION, SOLUTION INTRAMUSCULAR; INTRAVENOUS at 08:25

## 2024-11-21 RX ADMIN — LIDOCAINE HYDROCHLORIDE 100 MG: 20 INJECTION, SOLUTION EPIDURAL; INFILTRATION; INTRACAUDAL; PERINEURAL at 08:21

## 2024-11-21 NOTE — OP NOTE
Bilateral Tubal Ligation Operation Note    Anna Calix  11/21/2024    Pre-op Diagnosis:   Z30.2    Post-op Diagnosis:     Same    Procedure(s):  BILATERAL TUBAL FALLOPE FILSHIE CLIPPING LAPAROSCOPIC     Surgeon(s):  Marisol Orellana DO    Anesthesia: General    Staff:   Circulator: Kari Cee RN  Scrub Person: Renae Bridges  Vendor Representative: Nancy Westbrook  Assistant: Elisha Barber CSA    was responsible for performing the following activities: Suturing, Closing, and Held/Positioned Camera and their skilled assistance was necessary for the success of this case.      Estimated Blood Loss: none    Grafts/Implants: Two Filshie clips    Procedure     The patient was prepped and draped in normal sterile fashion. Umbilical incision was made with a scalpel. A nonbladed trocar was placed under direct visualization. The abdomen was insufflated with CO2 gas. A suprapubic port was also placed. One Filshie clip was placed on each fallopian tube. Both tubes were noted to be totally occluded. The skin was closed with 4-0 Monocryl and Dermabond. The patient tolerated the procedure and went to recovery room in stable condition.         Marisol Orellana DO     Date: 11/21/2024  Time: 08:43 EST   Reason for Disposition  • Second attempt to contact caller AND no contact made. Phone number verified.     Protocols used: No Contact or Duplicate Contact Call-ADULT-

## 2024-11-21 NOTE — ANESTHESIA PREPROCEDURE EVALUATION
Anesthesia Evaluation     Patient summary reviewed and Nursing notes reviewed   no history of anesthetic complications:                Airway   Mallampati: I  TM distance: >3 FB  Neck ROM: full  No difficulty expected  Dental - normal exam     Pulmonary - negative pulmonary ROS and normal exam   Cardiovascular - negative cardio ROS and normal exam  Exercise tolerance: good (4-7 METS)    NYHA Classification: II        Neuro/Psych- negative ROS  GI/Hepatic/Renal/Endo - negative ROS     Musculoskeletal (-) negative ROS    Abdominal  - normal exam    Bowel sounds: normal.   Substance History - negative use     OB/GYN    (+) Pregnant        Other - negative ROS                   Anesthesia Plan    ASA 2     general     intravenous induction     Anesthetic plan, risks, benefits, and alternatives have been provided, discussed and informed consent has been obtained with: patient.    CODE STATUS:

## 2024-11-21 NOTE — ANESTHESIA POSTPROCEDURE EVALUATION
Patient: Anna Calix    Procedure Summary       Date: 11/21/24 Room / Location: [facility] OR  /  [facility] OR    Anesthesia Start: 0818 Anesthesia Stop: 0844    Procedure: BILATERAL TUBAL FALLOPE FILSHIE CLIPPING LAPAROSCOPIC (Bilateral: Vagina) Diagnosis: (Z30.2)    Surgeons: Marisol Orellana DO Provider: Aakash Dillard DO    Anesthesia Type: general ASA Status: 2            Anesthesia Type: general    Vitals  Vitals Value Taken Time   /51 11/21/24 0900   Temp 97.1 °F (36.2 °C) 11/21/24 0845   Pulse 67 11/21/24 0900   Resp 18 11/21/24 0900   SpO2 99 % 11/21/24 0900           Post Anesthesia Care and Evaluation    Patient location during evaluation: PHASE II  Patient participation: complete - patient participated  Level of consciousness: awake and alert  Pain score: 1  Pain management: adequate    Airway patency: patent  Anesthetic complications: No anesthetic complications  PONV Status: controlled  Cardiovascular status: acceptable  Respiratory status: acceptable  Hydration status: acceptable

## 2024-11-21 NOTE — ANESTHESIA PROCEDURE NOTES
Airway  Urgency: elective    Date/Time: 11/21/2024 8:23 AM  Airway not difficult    General Information and Staff    Patient location during procedure: OR  CRNA/CAA: El Bashir CRNA    Indications and Patient Condition  Indications for airway management: airway protection    Preoxygenated: yes  MILS not maintained throughout  Mask difficulty assessment: 0 - not attempted    Final Airway Details  Final airway type: endotracheal airway      Successful airway: ETT  Cuffed: yes   Successful intubation technique: direct laryngoscopy  Endotracheal tube insertion site: oral  Blade: Ivory  Blade size: 2  ETT size (mm): 7.0  Cormack-Lehane Classification: grade I - full view of glottis  Placement verified by: chest auscultation and capnometry   Measured from: lips  ETT/EBT  to lips (cm): 22  Number of attempts at approach: 1  Assessment: lips, teeth, and gum same as pre-op and atraumatic intubation    Additional Comments  Atraumatic ETT placement, dentition unchanged.           79

## (undated) DEVICE — TUBING, SUCTION, 1/4" X 20', STRAIGHT: Brand: MEDLINE INDUSTRIES, INC.

## (undated) DEVICE — SUT MNCRYL 4/0 PS2 18 IN

## (undated) DEVICE — COR GYN LAPAROSCOPY: Brand: MEDLINE INDUSTRIES, INC.

## (undated) DEVICE — ST TBG PNEUMOCLEAR EVAC SMOKE HIFLO

## (undated) DEVICE — ADHS SKIN PREMIERPRO EXOFIN TOPICAL HI/VISC .5ML

## (undated) DEVICE — ENDOPATH XCEL BLADELESS TROCARS WITH STABILITY SLEEVES: Brand: ENDOPATH XCEL

## (undated) DEVICE — 40595 XL TRENDELENBURG POSITIONING KIT: Brand: 40595 XL TRENDELENBURG POSITIONING KIT

## (undated) DEVICE — GLV SURG TRIUMPH MICRO PF LTX 6.5 STRL